# Patient Record
Sex: MALE | Race: WHITE | NOT HISPANIC OR LATINO | Employment: UNEMPLOYED | URBAN - METROPOLITAN AREA
[De-identification: names, ages, dates, MRNs, and addresses within clinical notes are randomized per-mention and may not be internally consistent; named-entity substitution may affect disease eponyms.]

---

## 2023-06-15 ENCOUNTER — TELEPHONE (OUTPATIENT)
Dept: TRANSPLANT | Facility: CLINIC | Age: 23
End: 2023-06-15

## 2023-06-15 DIAGNOSIS — E71.529 ALD (ADRENOLEUKODYSTROPHY) (H): Primary | ICD-10-CM

## 2023-09-01 ENCOUNTER — CARE COORDINATION (OUTPATIENT)
Dept: TRANSPLANT | Facility: CLINIC | Age: 23
End: 2023-09-01
Payer: COMMERCIAL

## 2023-09-06 PROCEDURE — 84439 ASSAY OF FREE THYROXINE: CPT | Performed by: PEDIATRICS

## 2023-09-06 PROCEDURE — 84443 ASSAY THYROID STIM HORMONE: CPT | Performed by: PEDIATRICS

## 2023-09-06 PROCEDURE — 80053 COMPREHEN METABOLIC PANEL: CPT | Performed by: PEDIATRICS

## 2023-09-06 PROCEDURE — 85014 HEMATOCRIT: CPT | Performed by: PEDIATRICS

## 2023-09-11 NOTE — CONFIDENTIAL NOTE
RECORDS RECEIVED FROM: internal /ce   DATE RECEIVED: 10.12.23    NOTES (FOR ALL VISITS) STATUS DETAILS   OFFICE NOTES from referring provider internal  Willy Quintanilla MD     MEDICATION LIST internal     IMAGING        MRI (BRAIN) ce  [place]- 8/18/22     LABS     DIABETES: HBGA1C, CREATININE, FASTING LIPIDS, MICROALBUMIN URINE, POTASSIUM, TSH, T4    THYROID: TSH, T4, CBC, THYRODLONULIN, TOTAL T3, FREE T4, CALCITONIN, CEA internal /ce TSH- 9.6.23  T4- 9.6.23  CMP- 9.6.23  Cbc- 9.6.23  Lipid- 8/18/22  Cortisol- 8/18/22  Vitamin D- 8/18/22

## 2023-09-16 ENCOUNTER — HEALTH MAINTENANCE LETTER (OUTPATIENT)
Age: 23
End: 2023-09-16

## 2023-09-28 DIAGNOSIS — E71.529 ALD (ADRENOLEUKODYSTROPHY) (H): Primary | ICD-10-CM

## 2023-09-28 RX ORDER — MINOXIDIL 2.5 MG/1
TABLET ORAL
Qty: 30 TABLET | Refills: 1 | Status: SHIPPED | OUTPATIENT
Start: 2023-09-28 | End: 2024-02-01

## 2023-10-04 DIAGNOSIS — E71.529 ALD (ADRENOLEUKODYSTROPHY) (H): Primary | ICD-10-CM

## 2023-10-04 RX ORDER — FLUDROCORTISONE ACETATE 0.1 MG/1
0.1 TABLET ORAL DAILY
Qty: 60 TABLET | Refills: 3 | Status: SHIPPED | OUTPATIENT
Start: 2023-10-04

## 2023-10-05 ASSESSMENT — ENCOUNTER SYMPTOMS
NERVOUS/ANXIOUS: 1
INSOMNIA: 0
DECREASED CONCENTRATION: 1
PANIC: 0
DEPRESSION: 0

## 2023-10-11 NOTE — PROGRESS NOTES
Appointment reminder phone call made to patient. No answer. LVM advising pt to arrive 15 mins early.  Shanna Perez, CANDY

## 2023-10-12 ENCOUNTER — PRE VISIT (OUTPATIENT)
Dept: ENDOCRINOLOGY | Facility: CLINIC | Age: 23
End: 2023-10-12

## 2023-10-12 ENCOUNTER — OFFICE VISIT (OUTPATIENT)
Dept: ENDOCRINOLOGY | Facility: CLINIC | Age: 23
End: 2023-10-12
Attending: PEDIATRICS
Payer: COMMERCIAL

## 2023-10-12 VITALS
SYSTOLIC BLOOD PRESSURE: 123 MMHG | HEART RATE: 52 BPM | BODY MASS INDEX: 23.4 KG/M2 | WEIGHT: 158 LBS | HEIGHT: 69 IN | OXYGEN SATURATION: 98 % | DIASTOLIC BLOOD PRESSURE: 70 MMHG

## 2023-10-12 DIAGNOSIS — E27.40 ADRENAL INSUFFICIENCY (H): Primary | ICD-10-CM

## 2023-10-12 DIAGNOSIS — E71.529 ADRENOLEUKODYSTROPHY (H): ICD-10-CM

## 2023-10-12 PROCEDURE — 99204 OFFICE O/P NEW MOD 45 MIN: CPT | Mod: GC | Performed by: INTERNAL MEDICINE

## 2023-10-12 RX ORDER — CYPROHEPTADINE HYDROCHLORIDE 4 MG/1
TABLET ORAL
COMMUNITY
Start: 2022-08-30

## 2023-10-12 RX ORDER — LURASIDONE HYDROCHLORIDE 20 MG/1
TABLET, FILM COATED ORAL
COMMUNITY
Start: 2023-08-28 | End: 2023-11-07

## 2023-10-12 RX ORDER — UBROGEPANT 100 MG/1
TABLET ORAL
COMMUNITY
Start: 2021-12-29 | End: 2024-08-17

## 2023-10-12 RX ORDER — HYDROCORTISONE SODIUM SUCCINATE 100 MG/2ML
100 INJECTION, POWDER, FOR SOLUTION INTRAMUSCULAR; INTRAVENOUS ONCE
COMMUNITY
Start: 2023-08-16

## 2023-10-12 RX ORDER — HYDROCORTISONE 5 MG/1
TABLET ORAL
COMMUNITY
Start: 2023-08-10

## 2023-10-12 RX ORDER — LANOLIN ALCOHOL/MO/W.PET/CERES
6 CREAM (GRAM) TOPICAL DAILY
COMMUNITY

## 2023-10-12 ASSESSMENT — PAIN SCALES - GENERAL: PAINLEVEL: NO PAIN (0)

## 2023-10-12 ASSESSMENT — ENCOUNTER SYMPTOMS
NERVOUS/ANXIOUS: 1
DECREASED CONCENTRATION: 1

## 2023-10-12 NOTE — NURSING NOTE
"Chief Complaint   Patient presents with    Endocrine Problem     Vital signs:      BP: 123/70 Pulse: 52     SpO2: 98 %     Height: 175.3 cm (5' 9\") Weight: 71.7 kg (158 lb)  Estimated body mass index is 23.33 kg/m  as calculated from the following:    Height as of this encounter: 1.753 m (5' 9\").    Weight as of this encounter: 71.7 kg (158 lb).        "

## 2023-10-12 NOTE — PROGRESS NOTES
"Endocrinology and Diabetes      Brett Negro is a 22 year old yo male who is being referred for: Adrenal Insufficiency    Medical History Pertinent to this consultation includes: ALD s/p Allo SCT, Adrenal Insufficiency    He has been living in [place] but is going to be moving to [place] to work as as a  starting this winter.     He has history of ALD diagnosed at age 1 after a maternal cousin was diagnosed with ALD.     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      He believes he was diagnosed with adrenal insufficiency shortly after ALD diagnosis.     He has been taking extended release hydrocortisone, Plenadren, for 3-4 years which he sources from [place]. He takes 20 mg daily. He feels generally well with that once a day dosing, better to when he was taking hydrocortisone in 3 separate doses. Previously. he was taking Hydrocortisone 10 mg AM / 5 mg PM / 7.5 mg     He reports he rarely misses any doses of hydrocortisone.    Medical history is significant for history of bipolar disorder which she tells me diagnosis has been changed to PTSD.    While he rarely has needed to take extra hydrocortisone for physical illnesses he has taken this after immunizations and particularly after emotional/psychological stress    He notes that when he has emotional stress, he will take 5 mg and this makes him feel better. He does this maybe 2-3 times per week.  When he has emotional stress he describes feeling weak, feels \"depleted\", lethargic, emotionally sensitive, achy. These symptoms improve with an additional dose of hydrocortisone    He has 10 mg hydrocortisone, has gotten instructions on taking 30 mg TID in the case of acute illness.     He has IM Hydrocortisone injection kit.     He has hypertension, currently taking amlodipine 2.5 mg for hypertension and minoxidil 2.5 mg for hair loss.     Prior laboratory studies  8/10/2022 7 AM  ACTH, 266 " "picograms per mL, 15-66  Cortisol, 9.4 micrograms per deciliter, 5-25  Testosterone, 635 ng/dL, 175-781    Recent Labs   Lab Test 09/06/23  1105      POTASSIUM 3.9   CHLORIDE 102   CO2 30*   ANIONGAP 9   GLC 76   BUN 17.5   CR 0.96   KRYS 9.9          Prior imaging studies         Allergies   Allergen Reactions     Cherry Other (See Comments)     Other reaction(s): Edema-Airway     Daucus Carota Anaphylaxis     Raw carrots    Other reaction(s): Edema-Airway   Raw carrots     [place] Pulp Other (See Comments), GI Disturbance and Shortness Of Breath     Throat itching, trouble breathing    Other reaction(s): Resp Distress   Throat itching, trouble breathing   Throat itching, trouble breathing   Throat itching, trouble breathing     [place] Oil Itching     Diethyl Toluamide (Deet) Hives     Nuts Itching     Prunus Persica Itching     Penicillins Dizziness, Hives, Itching and Rash     Other reaction(s): Dizziness       Past Medical History:   Diagnosis Date     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       Past Surgical History:   Procedure Laterality Date     BRAIN TUMOR EXCISION      Age 1 1/2       Social History     Tobacco Use     Smoking status: Former     Types: Cigarettes     Smokeless tobacco: Never   Substance Use Topics     Alcohol use: Yes     Comment: Occassionally     Drug use: Yes     Types: Marijuana         Review of Systems   Psychiatric/Behavioral:  Positive for decreased concentration. The patient is nervous/anxious.    All other systems reviewed and are negative.       Objective    /70 (BP Location: Right arm, Patient Position: Sitting, Cuff Size: Adult Regular)   Pulse 52   Ht 1.753 m (5' 9\")   Wt 71.7 kg (158 lb)   SpO2 98%   BMI 23.33 kg/m       Wt Readings from Last 3 Encounters:   10/12/23 71.7 kg (158 lb)   09/06/23 73 kg (160 lb 15 oz)        Body mass index is 23.33 kg/m .    BSA 1.87    Physical Exam  Constitutional:       General: He is not in acute distress.     " Appearance: He is not ill-appearing.   Neck:      Comments: No thyromegaly  Cardiovascular:      Rate and Rhythm: Normal rate.      Heart sounds: Normal heart sounds. No murmur heard.  Pulmonary:      Effort: Pulmonary effort is normal.      Breath sounds: Normal breath sounds. No wheezing or rales.   Abdominal:      Palpations: Abdomen is soft.      Tenderness: There is no abdominal tenderness.   Musculoskeletal:      Right lower leg: No edema.      Left lower leg: No edema.   Skin:     Comments: He has some scars in posterior neck, anterior right sided chest with some degree of hyperpigmentation   Neurological:      Mental Status: He is alert.           Assessment.       ICD-10-CM    1. Adrenal insufficiency (H24)  E27.40 Adult Endocrinology  Referral      2. Adrenoleukodystrophy (H24)  E71.529 Adult Endocrinology  Referral           Plan    Adrenal insufficiency (H24)  Overall he reports feeling generally well on current dose of hydrocortisone.  He currently takes long-acting hydrocortisone (Plenadren) 20 mg in the morning with very consistency as his glucocorticoid replacement in addition to fludrocortisone 0.1 mg for mineralocorticoid replacement. Discussed that there is no role of an ACTH target for glucocorticoid replacement.     If he were to stay here for follow-up we would have recommended monitoring of electrolytes and renin to see if he can be on a lesser amount of fludrocortisone replacement.    Reviewed on the importance on taking the least amount of glucocorticoid replacement where he feels okay.  We discussed on trying to minimize the use of hydrocortisone for purelypsychological stress but if we were to do so he could try taking a 2.5 mg dose instead of 5 mg. He is agreeable on trying this.      For sick day dosing reviewed on taking hydrocortisone 20 mg every 8 hours but not the extended release formulation that he currently takes for regular replacement if he were acutely ill  with fevers for example.    He knows to inject IM hydrocortisone and seek care in the ER if he were to have an illness with nausea and vomiting which would prevent him from taking oral hydrocortisone.  He has a current Solu-Cortef kit at home.    Also stressed the importance of having a medical alert luci or similar             Tito Green MD  Endocrinology Fellow

## 2023-10-12 NOTE — LETTER
10/12/2023       RE: Brett Negro  [address]  [place] [address]     Dear Colleague,    Thank you for referring your patient, Brett Negro, to the [facility] [place] at [facility]. Please see a copy of my visit note below.    Appointment reminder phone call made to patient. No answer. LVM advising pt to arrive 15 mins early.  Shanna Perez, [facility]    Endocrinology and Diabetes      Brett Negro is a 22 year old yo male who is being referred for: Adrenal Insufficiency    Medical History Pertinent to this consultation includes: ALD s/p Allo SCT, Adrenal Insufficiency    He has been living in [place] but is going to be moving to [place] to work as as a  starting this winter.     He has history of ALD diagnosed at age 1 after a maternal cousin was diagnosed with ALD.     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      He believes he was diagnosed with adrenal insufficiency shortly after ALD diagnosis.     He has been taking extended release hydrocortisone, Plenadren, for 3-4 years which he sources from [place]. He takes 20 mg daily. He feels generally well with that once a day dosing, better to when he was taking hydrocortisone in 3 separate doses. Previously. he was taking Hydrocortisone 10 mg AM / 5 mg PM / 7.5 mg     He reports he rarely misses any doses of hydrocortisone.    Medical history is significant for history of bipolar disorder which she tells me diagnosis has been changed to PTSD.    While he rarely has needed to take extra hydrocortisone for physical illnesses he has taken this after immunizations and particularly after emotional/psychological stress    He notes that when he has emotional stress, he will take 5 mg and this makes him feel better. He does this maybe 2-3 times per week.  When he has emotional stress he describes  "feeling weak, feels \"depleted\", lethargic, emotionally sensitive, achy. These symptoms improve with an additional dose of hydrocortisone    He has 10 mg hydrocortisone, has gotten instructions on taking 30 mg TID in the case of acute illness.     He has IM Hydrocortisone injection kit.     He has hypertension, currently taking amlodipine 2.5 mg for hypertension and minoxidil 2.5 mg for hair loss.     Prior laboratory studies  8/10/2022 7 AM  ACTH, 266 picograms per mL, 15-66  Cortisol, 9.4 micrograms per deciliter, 5-25  Testosterone, 635 ng/dL, 175-781    Recent Labs   Lab Test 09/06/23  1105      POTASSIUM 3.9   CHLORIDE 102   CO2 30*   ANIONGAP 9   GLC 76   BUN 17.5   CR 0.96   KRYS 9.9          Prior imaging studies         Allergies   Allergen Reactions    Cherry Other (See Comments)     Other reaction(s): Edema-Airway    Daucus Carota Anaphylaxis     Raw carrots    Other reaction(s): Edema-Airway   Raw carrots    [place] Pulp Other (See Comments), GI Disturbance and Shortness Of Breath     Throat itching, trouble breathing    Other reaction(s): Resp Distress   Throat itching, trouble breathing   Throat itching, trouble breathing   Throat itching, trouble breathing    [place] Oil Itching    Diethyl Toluamide (Deet) Hives    Nuts Itching    Prunus Persica Itching    Penicillins Dizziness, Hives, Itching and Rash     Other reaction(s): Dizziness       Past Medical History:   Diagnosis Date    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       Past Surgical History:   Procedure Laterality Date    BRAIN TUMOR EXCISION      Age 1 1/2       Social History     Tobacco Use    Smoking status: Former     Types: Cigarettes    Smokeless tobacco: Never   Substance Use Topics    Alcohol use: Yes     Comment: Occassionally    Drug use: Yes     Types: Marijuana         Review of Systems   Psychiatric/Behavioral:  Positive for decreased concentration. The patient is nervous/anxious.    All other systems reviewed and " "are negative.       Objective    /70 (BP Location: Right arm, Patient Position: Sitting, Cuff Size: Adult Regular)   Pulse 52   Ht 1.753 m (5' 9\")   Wt 71.7 kg (158 lb)   SpO2 98%   BMI 23.33 kg/m       Wt Readings from Last 3 Encounters:   10/12/23 71.7 kg (158 lb)   09/06/23 73 kg (160 lb 15 oz)        Body mass index is 23.33 kg/m .    BSA 1.87    Physical Exam  Constitutional:       General: He is not in acute distress.     Appearance: He is not ill-appearing.   Neck:      Comments: No thyromegaly  Cardiovascular:      Rate and Rhythm: Normal rate.      Heart sounds: Normal heart sounds. No murmur heard.  Pulmonary:      Effort: Pulmonary effort is normal.      Breath sounds: Normal breath sounds. No wheezing or rales.   Abdominal:      Palpations: Abdomen is soft.      Tenderness: There is no abdominal tenderness.   Musculoskeletal:      Right lower leg: No edema.      Left lower leg: No edema.   Skin:     Comments: He has some scars in posterior neck, anterior right sided chest with some degree of hyperpigmentation   Neurological:      Mental Status: He is alert.           Assessment.       ICD-10-CM    1. Adrenal insufficiency (H24)  E27.40 Adult Endocrinology  Referral      2. Adrenoleukodystrophy (H24)  E71.529 Adult Endocrinology  Referral           Plan    Adrenal insufficiency (H24)  Overall he reports feeling generally well on current dose of hydrocortisone.  He currently takes long-acting hydrocortisone (Plenadren) 20 mg in the morning with very consistency as his glucocorticoid replacement in addition to fludrocortisone 0.1 mg for mineralocorticoid replacement. Discussed that there is no role of an ACTH target for glucocorticoid replacement.     If he were to stay here for follow-up we would have recommended monitoring of electrolytes and renin to see if he can be on a lesser amount of fludrocortisone replacement.    Reviewed on the importance on taking the least amount of " glucocorticoid replacement where he feels okay.  We discussed on trying to minimize the use of hydrocortisone for purelypsychological stress but if we were to do so he could try taking a 2.5 mg dose instead of 5 mg. He is agreeable on trying this.      For sick day dosing reviewed on taking hydrocortisone 20 mg every 8 hours but not the extended release formulation that he currently takes for regular replacement if he were acutely ill with fevers for example.    He knows to inject IM hydrocortisone and seek care in the ER if he were to have an illness with nausea and vomiting which would prevent him from taking oral hydrocortisone.  He has a current Solu-Cortef kit at home.    Also stressed the importance of having a medical alert bracelet or similar             Tito Green MD  Endocrinology Fellow    Attestation signed by Lane Chavez MD at 10/14/2023  4:29 PM:  Attestation    I have seen patient and discussed management plan with the patient on October 12, 2023.   I have discussed management plan with Endocrinology Fellow and agree with the assessment and plan of care as documented below.        Adrenoleukodystrophy status post allo SCT and diagnosed with adrenal insufficiency shortly after adrenoleukodystrophy diagnosis.  Currently, he is on hydrocortisone extended release and he has been on this dose for 3-4 years.  AI symptoms well controlled on his current dose of hydrocortisone.  We had a long discussion regarding sick day rules particularly surrounding mental health illness and steroid management.  He has been administering short acting hydrocortisone 5 mg for panic attacks or anxiety symptoms based on severity and its been helping.  Per literature review there are case reports of steroid adjustment for mental health distress.  In this case, it has helped therefore we discussed that when it is absolutely necessary to take hydrocortisone 2.5 mg for mental health exacerbations like panic attacks or  severe anxiety symptoms.  No signs and symptoms of Cushing's syndrome on the current dose of hydrocortisone.  He also takes fludrocortisone 0.1 mg daily and his blood pressure is within the range and his most recent potassium is a stable.  With his routine future labs renin activity can be checked.  Other hormones including testosterone reviewed which were normal previously.  If he remains in [place] would like to see him twice a year but per report he is moving to [place] and we encouraged to establish care locally.    55 minutes spent by me on the date of the encounter doing chart review, history and exam, documentation and further activities per the note.    Lane Chavez MD  Staff Endocrinologist

## 2023-10-13 NOTE — ASSESSMENT & PLAN NOTE
Overall he reports feeling generally well on current dose of hydrocortisone (10.69 mg / m2).  He currently takes long-acting hydrocortisone (Plenadren) 20 mg in the morning with very consistency as his glucocorticoid replacement in addition to fludrocortisone 0.1 mg for mineralocorticoid replacement. Discussed that there is no role of an ACTH target for glucocorticoid replacement.     If he were to stay here for follow-up we would have recommended monitoring of electrolytes and renin to see if he can be on a lesser amount of fludrocortisone replacement.    Reviewed on the importance on taking the least amount of glucocorticoid replacement where he feels okay.  We discussed on trying to minimize the use of hydrocortisone for purelypsychological stress but if we were to do so he could try taking a 2.5 mg dose instead of 5 mg. He is agreeable on trying this.      For sick day dosing reviewed on taking hydrocortisone 20 mg every 8 hours but not the extended release formulation that he currently takes for regular replacement if he were acutely ill with fevers for example.    He knows to inject IM hydrocortisone and seek care in the ER if he were to have an illness with nausea and vomiting which would prevent him from taking oral hydrocortisone.  He has a current Solu-Cortef kit at home.    Also stressed the importance of having a medical alert bracelet or similar

## 2023-10-31 ENCOUNTER — HOSPITAL ENCOUNTER (EMERGENCY)
Facility: CLINIC | Age: 23
Discharge: HOME | End: 2023-10-31
Attending: EMERGENCY MEDICINE | Admitting: EMERGENCY MEDICINE
Payer: COMMERCIAL

## 2023-10-31 VITALS
HEIGHT: 69 IN | WEIGHT: 160 LBS | SYSTOLIC BLOOD PRESSURE: 112 MMHG | RESPIRATION RATE: 16 BRPM | TEMPERATURE: 98.5 F | OXYGEN SATURATION: 98 % | BODY MASS INDEX: 23.7 KG/M2 | HEART RATE: 48 BPM | DIASTOLIC BLOOD PRESSURE: 71 MMHG

## 2023-10-31 DIAGNOSIS — G43.819 OTHER MIGRAINE WITHOUT STATUS MIGRAINOSUS, INTRACTABLE: Primary | ICD-10-CM

## 2023-10-31 PROCEDURE — 96361 HYDRATE IV INFUSION ADD-ON: CPT | Performed by: EMERGENCY MEDICINE

## 2023-10-31 PROCEDURE — 99285 EMERGENCY DEPT VISIT HI MDM: CPT | Performed by: EMERGENCY MEDICINE

## 2023-10-31 PROCEDURE — 258N000003 HC RX IP 258 OP 636: Performed by: EMERGENCY MEDICINE

## 2023-10-31 PROCEDURE — 250N000011 HC RX IP 250 OP 636: Mod: JZ | Performed by: EMERGENCY MEDICINE

## 2023-10-31 PROCEDURE — 96375 TX/PRO/DX INJ NEW DRUG ADDON: CPT | Performed by: EMERGENCY MEDICINE

## 2023-10-31 PROCEDURE — 99284 EMERGENCY DEPT VISIT MOD MDM: CPT | Mod: 25 | Performed by: EMERGENCY MEDICINE

## 2023-10-31 PROCEDURE — 96374 THER/PROPH/DIAG INJ IV PUSH: CPT | Performed by: EMERGENCY MEDICINE

## 2023-10-31 RX ORDER — METOCLOPRAMIDE HYDROCHLORIDE 5 MG/ML
5 INJECTION INTRAMUSCULAR; INTRAVENOUS ONCE
Status: COMPLETED | OUTPATIENT
Start: 2023-10-31 | End: 2023-10-31

## 2023-10-31 RX ORDER — DIPHENHYDRAMINE HYDROCHLORIDE 50 MG/ML
25 INJECTION INTRAMUSCULAR; INTRAVENOUS ONCE
Status: COMPLETED | OUTPATIENT
Start: 2023-10-31 | End: 2023-10-31

## 2023-10-31 RX ORDER — KETOROLAC TROMETHAMINE 15 MG/ML
15 INJECTION, SOLUTION INTRAMUSCULAR; INTRAVENOUS ONCE
Status: COMPLETED | OUTPATIENT
Start: 2023-10-31 | End: 2023-10-31

## 2023-10-31 RX ADMIN — SODIUM CHLORIDE 1000 ML: 9 INJECTION, SOLUTION INTRAVENOUS at 20:26

## 2023-10-31 RX ADMIN — KETOROLAC TROMETHAMINE 15 MG: 15 INJECTION, SOLUTION INTRAMUSCULAR; INTRAVENOUS at 20:27

## 2023-10-31 RX ADMIN — DIPHENHYDRAMINE HYDROCHLORIDE 25 MG: 50 INJECTION, SOLUTION INTRAMUSCULAR; INTRAVENOUS at 20:28

## 2023-10-31 RX ADMIN — METOCLOPRAMIDE 5 MG: 5 INJECTION, SOLUTION INTRAMUSCULAR; INTRAVENOUS at 20:29

## 2023-10-31 ASSESSMENT — ACTIVITIES OF DAILY LIVING (ADL): ADLS_ACUITY_SCORE: 35

## 2023-11-01 NOTE — ED TRIAGE NOTES
Pt came in for headache started on 10/25. Currently 6/10 pain. Hx of chronic migraines. Pain was 10/10 this morning. Pt hoping to get migraine cocktail. Took home medications with little relief      Triage Assessment (Adult)       Row Name 10/31/23 [id number]          Triage Assessment    Airway WDL WDL        Respiratory WDL    Respiratory WDL WDL        Skin Circulation/Temperature WDL    Skin Circulation/Temperature WDL WDL        Cardiac WDL    Cardiac WDL X;rhythm     Pulse Rate & Regularity bradycardic        Peripheral/Neurovascular WDL    Peripheral Neurovascular WDL WDL        Cognitive/Neuro/Behavioral WDL    Cognitive/Neuro/Behavioral WDL WDL

## 2023-11-01 NOTE — ED PROVIDER NOTES
ED Provider Note  [facility]      History     Chief Complaint   Patient presents with    Headache     HPI  Brett Negro is a 22 year old male with a past medical history of autism, anxiety, depression, and adrenoleukodystrophy who presents to the emergency department seeking evaluation of a migraine. He states that this has been ongoing for the past 4-5 days. He has tried his usual medications and neuromodulation devices to no avail. He states that it has improved slightly since this morning, but that it is still quite bad. In the past, he has experienced episodes on ongoing intractable migraines, and has been helped by Erenumab-aooe. He states that tryptans do not work. He states that he has not has trouble with his blood pressures as of late. In regards to his migraine manifestations, he states that his entire head is experiencing pain and pressure. With this, he has had difficulty focusing, sensitivity to light and sound, and irritability. He denies any visual changes beyond photophobia. He states that he generally feels weak and his neck feels tight. In regards to possible causes of the migraine, he states that he has been under a good deal of stress for a large paper that was due as well as the changing of the seasons.         Past Medical History  Past Medical History:   Diagnosis Date    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     Past Surgical History:   Procedure Laterality Date    BRAIN TUMOR EXCISION      Age 1 1/2     amLODIPine (NORVASC) 2.5 MG tablet  Calcium Carb-Cholecalciferol 600-10 MG-MCG CAPS  cyproheptadine (PERIACTIN) 4 MG tablet  erenumab-aooe (AIMOVIG) 140 MG/ML injection  fludrocortisone (FLORINEF) 0.1 MG tablet  hydrocortisone (CORTEF) 5 MG tablet  lurasidone (LATUDA) 20 MG TABS tablet  melatonin 3 MG tablet  minoxidil (LONITEN) 2.5 MG tablet  SOLU-CORTEF 100 MG injection  UBRELVY 100 MG tablet      Allergies   Allergen Reactions    Cherry Other  "(See Comments)     Other reaction(s): Edema-Airway    Daucus Carota Anaphylaxis     Raw carrots    Other reaction(s): Edema-Airway   Raw carrots    [place] Pulp Other (See Comments), GI Disturbance and Shortness Of Breath     Throat itching, trouble breathing    Other reaction(s): Resp Distress   Throat itching, trouble breathing   Throat itching, trouble breathing   Throat itching, trouble breathing    [place] Oil Itching    Diethyl Toluamide (Deet) Hives    Nuts Itching    Prunus Persica Itching    Penicillins Dizziness, Hives, Itching and Rash     Other reaction(s): Dizziness     Family History  Family History   Problem Relation Age of Onset    Other - See Comments Mother         ALD carrier    Anxiety Disorder Sister     Mental Illness Paternal Grandmother     Hypertension Paternal Grandfather      Social History   Social History     Tobacco Use    Smoking status: Former     Types: Cigarettes    Smokeless tobacco: Never   Substance Use Topics    Alcohol use: Yes     Comment: Occassionally    Drug use: Yes     Types: Marijuana      Past medical history, past surgical history, medications, allergies, family history, and social history were reviewed with the patient. No additional pertinent items.      A medically appropriate review of systems was performed with pertinent positives and negatives noted in the HPI, and all other systems negative.    Physical Exam   BP: 127/72  Pulse: (!) 48  Temp: 97.9  F (36.6  C)  Resp: 18  Height: 175.3 cm (5' 9\")  Weight: 72.6 kg (160 lb)  SpO2: 96 %  Physical Exam  Vitals and nursing note reviewed.   Constitutional:       General: He is in acute distress.      Appearance: Normal appearance. He is not ill-appearing or diaphoretic.   HENT:      Head: Normocephalic.      Nose: Nose normal.   Eyes:      Pupils: Pupils are equal, round, and reactive to light.   Neck:      Comments: Surgical scars noted  Cardiovascular:      Rate and Rhythm: Normal rate and regular rhythm.   Pulmonary:    "   Effort: Pulmonary effort is normal.   Abdominal:      General: There is no distension.   Musculoskeletal:         General: No deformity. Normal range of motion.      Cervical back: Normal range of motion and neck supple. No rigidity or tenderness.   Skin:     General: Skin is warm.   Neurological:      General: No focal deficit present.      Mental Status: He is alert and oriented to person, place, and time.      Cranial Nerves: No cranial nerve deficit.      Sensory: No sensory deficit.      Motor: No weakness.      Coordination: Coordination normal.      Gait: Gait normal.   Psychiatric:         Mood and Affect: Mood normal.           ED Course, Procedures, & Data     ED Course as of 10/31/23 2215   Tue Oct 31, 2023   2016 Patient with history of chronic migraines, presents the ED with headache for the past 3 to 4 days, unrelieved with home medications.  On arrival, has normal vital signs aside from sinus bradycardia.  Nonfocal neuro exam.   2016 No acute onset, maximal intensity at onset, or thunderclap quality, unlikely subarachnoid hemorrhage.  Nonfocal neuro exam, unlikely ischemic CVA.  No ill appearance or new to rigidity, less likely meningitis/encephalitis.   2016 Likely diagnosis acute on chronic migraine.  We will give migraine cocktail of diphenhydramine, ketorolac, metoclopramide, and 1 L of IV normal saline and reassess.   2017 BP: 127/72   2017 Temp: 97.9  F (36.6  C)   2017 Pulse(!): 48   2017 Resp: 18   2017 SpO2: 96 %   2156 On reassessment, patient's symptoms are significantly improved.  Repeat exam is reassuring.  Patient feels comfortable home.  Plan of care discussed with patient and his father who is at bedside.  Plan to call neurology and follow-up in outpatient setting.  Educated reasons to return to the ED.     No results found for any visits on 10/31/23.  Medications   sodium chloride 0.9% BOLUS 1,000 mL (0 mLs Intravenous Stopped 10/31/23 2125)   ketorolac (TORADOL) injection 15 mg (15  mg Intravenous $Given 10/31/23 2027)   metoclopramide (REGLAN) injection 5 mg (5 mg Intravenous $Given 10/31/23 2029)   diphenhydrAMINE (BENADRYL) injection 25 mg (25 mg Intravenous $Given 10/31/23 2028)     Labs Ordered and Resulted from Time of ED Arrival to Time of ED Departure - No data to display  No orders to display          Critical care was not performed.     Medical Decision Making  The patient's presentation was of high complexity (a chronic illness severe exacerbation, progression, or side effect of treatment).    The patient's evaluation involved:  review of 3+ test result(s) ordered prior to this encounter (reviwed MRI from 2022, 2021, and 2020)    The patient's management necessitated moderate risk (prescription drug management including medications given in the ED).    Assessment & Plan    Acute on chronic migraine.  Sx improved significantly with migraine cocktail.  Plan for discharge with neurology follow-up.  We will continue usual medications.  Educated reasons to return to the ED.    I have reviewed the nursing notes. I have reviewed the findings, diagnosis, plan and need for follow up with the patient.    Discharge Medication List as of 10/31/2023  9:59 PM          Final diagnoses:   Other migraine without status migrainosus, intractable   I, Noe Novoa, am serving as a trained medical scribe to document services personally performed by Ernesto Vaz DO, based on the provider's statements to me.     IErnesto DO, was physically present and have reviewed and verified the accuracy of this note documented by Noe Novoa.      Ernesto Vaz DO  [facility] EMERGENCY DEPARTMENT  10/31/2023     Ernesto Vaz DO  10/31/23 0832

## 2023-11-01 NOTE — DISCHARGE INSTRUCTIONS
Be sure to follow-up with your neurologist.  Take all your usual medications as prescribed.  Return to the ED with any new or worsening symptoms.

## 2023-11-06 ENCOUNTER — TELEPHONE (OUTPATIENT)
Dept: NEUROLOGY | Facility: CLINIC | Age: 23
End: 2023-11-06
Payer: COMMERCIAL

## 2023-11-06 ASSESSMENT — HEADACHE IMPACT TEST (HIT 6)
HOW OFTEN HAVE YOU FELT FED UP OR IRRITATED BECAUSE OF YOUR HEADACHES: ALWAYS
HOW OFTEN DID HEADACHS LIMIT CONCENTRATION ON WORK OR DAILY ACTIVITY: VERY OFTEN
HIT6 TOTAL SCORE: 68
WHEN YOU HAVE A HEADACHE HOW OFTEN DO YOU WISH YOU COULD LIE DOWN: VERY OFTEN
HOW OFTEN DO HEADACHES LIMIT YOUR DAILY ACTIVITIES: VERY OFTEN
WHEN YOU HAVE HEADACHES HOW OFTEN IS THE PAIN SEVERE: VERY OFTEN
HOW OFTEN HAVE YOU FELT TOO TIRED TO WORK BECAUSE OF YOUR HEADACHES: VERY OFTEN

## 2023-11-06 NOTE — TELEPHONE ENCOUNTER
Call was placed to patient regarding referral for Neurology. Message was left for patient to call and schedule with first available provider within the headache team.

## 2023-11-07 ENCOUNTER — OFFICE VISIT (OUTPATIENT)
Dept: NEUROLOGY | Facility: CLINIC | Age: 23
End: 2023-11-07
Attending: EMERGENCY MEDICINE
Payer: COMMERCIAL

## 2023-11-07 ENCOUNTER — TELEPHONE (OUTPATIENT)
Dept: NEUROLOGY | Facility: CLINIC | Age: 23
End: 2023-11-07

## 2023-11-07 VITALS
HEIGHT: 69 IN | HEART RATE: 54 BPM | WEIGHT: 160 LBS | SYSTOLIC BLOOD PRESSURE: 133 MMHG | BODY MASS INDEX: 23.7 KG/M2 | DIASTOLIC BLOOD PRESSURE: 83 MMHG | OXYGEN SATURATION: 98 %

## 2023-11-07 DIAGNOSIS — G43.709 CHRONIC MIGRAINE WITHOUT AURA WITHOUT STATUS MIGRAINOSUS, NOT INTRACTABLE: Primary | ICD-10-CM

## 2023-11-07 PROCEDURE — 99204 OFFICE O/P NEW MOD 45 MIN: CPT

## 2023-11-07 RX ORDER — ONDANSETRON 4 MG/1
4 TABLET, FILM COATED ORAL EVERY 8 HOURS PRN
Qty: 20 TABLET | Refills: 3 | Status: SHIPPED | OUTPATIENT
Start: 2023-11-07

## 2023-11-07 ASSESSMENT — PAIN SCALES - GENERAL: PAINLEVEL: MILD PAIN (2)

## 2023-11-07 NOTE — TELEPHONE ENCOUNTER
Patient's father Selvin requests a call back regarding status of Botox pre-authorization. Please call Selvin at [phone]. Thank you

## 2023-11-07 NOTE — LETTER
11/7/2023         RE: Brett Negro  [address]  [place] [address]        Dear Colleague,    Thank you for referring your patient, Brett Negro, to the [facility]. Please see a copy of my visit note below.    [facility]   Headache Neurology Consult    November 7, 2023     Brett Negro MRN# [id number]   YOB: 2000 Age: 22 year old     Referring provider: Ernesto Vaz          Assessment and Recommendations:     Brett Negro is a 22 year old male who presents for further evaluation of headache.    His headache presentation meets criteria for chronic migraine without aura. His headache history is complicated by history of adrenoleukodystrophy, adrenal insufficiency.     His neurologic examination is overall intact today.  He gets routine MRI brains to monitor his adrenoleukodystrophy. Reviewed his last MRI from August 2022 which is overall reassuring. I did not recommend further evaluation for secondary cause of headache today.     We discussed the following treatment strategy:  - For acute treatment of mild headache, he may use naproxen as needed, not to exceed 14 days per month to avoid medication overuse.   - For acute treatment of moderate to severe headache, he may use Ubrelvy 100 mg taken at onset of headache with a repeat dose after 2 hours if needed.   - He may use Cefaly and Nerivio devices for acute headache treatment.   - For nausea with headache, he may use ondansetron 4 mg tablet as needed.  Metoclopramide has also historically been helpful.   - Should he require anything for intractable headache rescue, he has previously responded well to a combination of ketorolac, metoclopramide, and diphenhydramine.    His current frequency and severity of headaches warrant prevention.  - I recommend he continue with Aimovig 140 mg subcutaneous injection every 30 days.  - I recommend a retrial of  botulinum toxin injections following a chronic migraine protocol.  Previously he was getting 155 units but has not received injections in over 7 months.  We will work to obtain prior authorization for this.    I will see him back in clinic in a few weeks for first round of injections.  We will plan for a routine follow-up in 6 to 9 months to monitor effectiveness.    Marzena Bhandari PA-C  Headache Neurology  [facility]            Chief Complaint:     Chief Complaint   Patient presents with     Headache     Other migraine without status migrainous            History is obtained from the patient and medical record.      Brett Negro is a 22 year old male who presents for further evaluation of headaches.     He reports a history of chronic migraines for the past 5 years or so.   Headache can be intense pressure at bilateral sides of the head. Headache can also be unilateral, though laterality can vary. He can also have generalized holocephalic headache pain. Headaches can be pounding or throbbing, and he can also occasionally have sharp or stabbing headaches.   He can have neck tension or jaw pain at times. Typical headaches are a 5-7/10 but they can reach an 11/10 when severe.   Typically headaches last several hours in duration. Rarely do they last several days in a row.    He has associated nausea without vomiting. He reports photophobia, phonophobia, some osmophobia. He will feel generally weak and fatigued.     He currently reports 30/30 headache days per month with 16-20/30 severe headache days per month. He notes headaches have been worse the past few months. Over the summer, he would have reported 8/30 headache days per month with 2-4/30 severe headache days per month.     He reports his vision can be more out of focus when he has a headache. He denies typical aura or other visual disturbances.   He denies focal paresthesias or weakness, unilateral autonomic features, positional  component, exertional component.     For acute treatment of headaches, he will use Cefaly and Nerivio. He will use Ubrelvy 100 mg and this is effective. Occasionally he will use naproxen or Excedrin. Acetaminophen is not very effective.    For headache prevention, he currently uses Aimovig 150 mg monthly. He previously was also doing Botox injections which were helpful, [place]     Last week was seen in the ER for migraine lasting 1+ week.  He thinks the combination of increased mental workload, dietary changes, weather changes may have provoked his recent bout of migraine.     He is trying to follow a keto diet.     He has a history of adrenal insufficiency.     He denies family history of headache.     Current headache treatments:  Acute therapies:  - Excedrin, naproxen  - Ubrelvy 100 mg - effective   - Cefaly  - Nerivio  - Cyproheptadine - uses for weather-related headaches, somewhat helpful, causes drowsiness    Preventative therapies:  - Aimovig 140 mg - helpful  - Amlodipine (hypertension)  - Magnesium  - Riboflavin     Supportive therapies:    Previous treatments tried:  Acute therapies:  - Sumatriptan - fatigue, rebound headache  - Eletriptan - fatigue  - Nurtec - helpful but likes Ubrelvy better    Preventative therapies:  - Venlafaxine - not effective   - Topiramate - not effective  - Depakote - cognitive slowing, worse depression  - Botox 155 units - last done about 7 months ago    Supportive therapies:            Past Medical History:     Past Medical History:   Diagnosis Date     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      Depression, anxiety, Bipolar 2 disorder, cPTSD - follows with therapist weekly     History of adrenoleukodystrophy, s/p stem cell transplant  Astrocytoma removed when he was a child          Past Surgical History:     Past Surgical History:   Procedure Laterality Date     BRAIN TUMOR EXCISION      Age 1 1/2             Social History:      Social History     Socioeconomic History     Marital status: Patient Declined     Spouse name: Not on file     Number of children: Not on file     Years of education: Not on file     Highest education level: Not on file   Occupational History     Occupation: Student,    Tobacco Use     Smoking status: Former     Types: Cigarettes     Smokeless tobacco: Never   Substance and Sexual Activity     Alcohol use: Yes     Comment: Occassionally     Drug use: Yes     Types: Marijuana     Sexual activity: Not on file   Other Topics Concern     Not on file   Social History Narrative     Not on file     Social Determinants of Health     Financial Resource Strain: Not on file   Food Insecurity: Not on file   Transportation Needs: Not on file   Physical Activity: Not on file   Stress: Not on file   Social Connections: Not on file   Interpersonal Safety: Not on file   Housing Stability: Not on file             Family History:     Family History   Problem Relation Age of Onset     Other - See Comments Mother         ALD carrier     Anxiety Disorder Sister      Mental Illness Paternal Grandmother      Hypertension Paternal Grandfather              Allergies:      Allergies   Allergen Reactions     Cherry Other (See Comments)     Other reaction(s): Edema-Airway     Daucus Carota Anaphylaxis     Raw carrots    Other reaction(s): Edema-Airway   Raw carrots     [place] Pulp Other (See Comments), GI Disturbance and Shortness Of Breath     Throat itching, trouble breathing    Other reaction(s): Resp Distress   Throat itching, trouble breathing   Throat itching, trouble breathing   Throat itching, trouble breathing     [place] Oil Itching     Diethyl Toluamide (Deet) Hives     Nuts Itching     Prunus Persica Itching     Penicillins Dizziness, Hives, Itching and Rash     Other reaction(s): Dizziness             Medications:     Current Outpatient Medications:      amLODIPine (NORVASC) 2.5 MG tablet, Take 2.5 mg by mouth, Disp:  ", Rfl:      Calcium Carb-Cholecalciferol 600-10 MG-MCG CAPS, Take by mouth., Disp: , Rfl:      cyproheptadine (PERIACTIN) 4 MG tablet, One tab or 1/2 a tab at bedtime nightly or as needed for weather related headaches, Disp: , Rfl:      erenumab-aooe (AIMOVIG) 140 MG/ML injection, Inject 140 mg Subcutaneous every 30 days, Disp: , Rfl:      fludrocortisone (FLORINEF) 0.1 MG tablet, Take 1 tablet (0.1 mg) by mouth daily, Disp: 60 tablet, Rfl: 3     hydrocortisone (CORTEF) 5 MG tablet, , Disp: , Rfl:      melatonin 3 MG tablet, Take 6 mg by mouth daily, Disp: , Rfl:      minoxidil (LONITEN) 2.5 MG tablet, Take one-half tablet per day to equal 1.25mg per day, Disp: 30 tablet, Rfl: 1     SOLU-CORTEF 100 MG injection, Inject 100 mg into the muscle once, Disp: , Rfl:      UBRELVY 100 MG tablet, take 1 tablet by mouth once daily if needed, Disp: , Rfl:      lurasidone (LATUDA) 20 MG TABS tablet, , Disp: , Rfl:           Physical Exam:   /83   Pulse 54   Ht 1.753 m (5' 9\")   Wt 72.6 kg (160 lb)   SpO2 98%   BMI 23.63 kg/m       General: In no acute distress.  Head: Normocephalic, atraumatic. No radiating pain with palpation over the supraorbital notches, occipital nerves. Temporal pulses intact.   Neck: Normal range of motion with lateral head movements and neck flexion.  Eyes: No conjunctival injection, no scleral icterus.     Neurologic Exam:  Mental Status Exam: Alert, awake and oriented to situation. No dysarthria. Speech of normal fluency.  Cranial Nerves: Fundoscopic exam with clear disc margins bilaterally. PERRLA, EOMs intact, no nystagmus, facial movements symmetric, facial sensation intact to light touch, hearing intact to conversation, trapezius and SCMs 5/5 bilaterally, tongue midline and fully mobile. No tongue atrophy or fasciculations.   Motor: Normal tone in all four extremities, no atrophy or fasciculations. 5/5 strength bilaterally in shoulder abduction, elbow flexion and extension, wrist flexion " and extension, hip flexion, knee flexion and extension, dorsiflexion and plantarflexion. No tremors or abnormal movements noted.  Sensory: Sensation intact to light touch on arms and legs bilaterally.   Coordination: Finger-nose-finger intact bilaterally. Rapidly alternating movements intact bilaterally in the upper extremities. Normal finger tapping bilaterally. Normal Romberg.  Reflexes: 2+ and symmetric in triceps, biceps, brachioradialis, patellar, and Achilles. Plantar reflexes are downgoing bilaterally.  Gait: Normal gait. Able to toe and heel walk. Normal tandem gait.            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     COMPARISON: MRI Brain 6/14/21     TECHNIQUE/PROTOCOL: Standard adult brain MRI protocol pre and post contrast   and multivoxel 2D MRI spectroscopy.     BRAIN FINDINGS:     Redemonstrated are postsurgical changes associated with suboccipital   craniotomy and resection of the prior cerebellar mass. Unchanged areas of   elevated T2/FLAIR signal abnormality adjacent to the posterior fossa   resection cavity. No abnormal enhancement. No change from the prior.     Other brain Parenchyma:  No hemorrhage, cerebral edema, acute cortical   infarction, or midline shift. Prominent supratentorial perivascular spaces   are again noted.   Ventricles and Sulci: Mildly prominent for age, unchanged.   Extra-Axial Spaces: No extra-axial fluid collection.   Basal Cisterns: Normal.   Intracranial Flow-Voids: Normal.     Paranasal Sinuses: Normal.   Mastoid Sinuses: Normal.   Orbits: Normal.   Cranium: Suboccipital craniotomy.       MR SPECTROSCOPY:   2D MR spectroscopy was performed to evaluate the areas of signal   abnormality adjacent to the posterior fossa  resection cavity in the medial   cerebellum on the left using echo times of 30 and 135 ms. This area   demonstrates decreased NABIL and elevated lactate peak consistent with   gliosis, and unchanged.     IMPRESSION:   Stable exam compared to 6/14/21. Post surgical change of the posterior   fossa related to prior mass resection. No evidence of disease progression.         Again, thank you for allowing me to participate in the care of your patient.        Sincerely,        BAKARI CARBALLO PA-C

## 2023-11-07 NOTE — PROGRESS NOTES
[facility]   Headache Neurology Consult    November 7, 2023     Brett Negro MRN# [id number]   YOB: 2000 Age: 22 year old     Referring provider: Ernesto Vaz          Assessment and Recommendations:     Brett Negro is a 22 year old male who presents for further evaluation of headache.    His headache presentation meets criteria for chronic migraine without aura. His headache history is complicated by history of adrenoleukodystrophy, adrenal insufficiency.     His neurologic examination is overall intact today.  He gets routine MRI brains to monitor his adrenoleukodystrophy. Reviewed his last MRI from August 2022 which is overall reassuring. I did not recommend further evaluation for secondary cause of headache today.     We discussed the following treatment strategy:  - For acute treatment of mild headache, he may use naproxen as needed, not to exceed 14 days per month to avoid medication overuse.   - For acute treatment of moderate to severe headache, he may use Ubrelvy 100 mg taken at onset of headache with a repeat dose after 2 hours if needed.   - He may use Cefaly and Nerivio devices for acute headache treatment.   - For nausea with headache, he may use ondansetron 4 mg tablet as needed.  Metoclopramide has also historically been helpful.   - Should he require anything for intractable headache rescue, he has previously responded well to a combination of ketorolac, metoclopramide, and diphenhydramine.    His current frequency and severity of headaches warrant prevention.  - I recommend he continue with Aimovig 140 mg subcutaneous injection every 30 days.  - I recommend a retrial of botulinum toxin injections following a chronic migraine protocol.  Previously he was getting 155 units but has not received injections in over 7 months.  We will work to obtain prior authorization for this.    I will see him back in clinic in a few weeks for first  round of injections.  We will plan for a routine follow-up in 6 to 9 months to monitor effectiveness.    Marzena Bhandari PA-C  Headache Neurology  [facility]            Chief Complaint:     Chief Complaint   Patient presents with    Headache     Other migraine without status migrainous            History is obtained from the patient and medical record.      Brett Negro is a 22 year old male who presents for further evaluation of headaches.     He reports a history of chronic migraines for the past 5 years or so.   Headache can be intense pressure at bilateral sides of the head. Headache can also be unilateral, though laterality can vary. He can also have generalized holocephalic headache pain. Headaches can be pounding or throbbing, and he can also occasionally have sharp or stabbing headaches.   He can have neck tension or jaw pain at times. Typical headaches are a 5-7/10 but they can reach an 11/10 when severe.   Typically headaches last several hours in duration. Rarely do they last several days in a row.    He has associated nausea without vomiting. He reports photophobia, phonophobia, some osmophobia. He will feel generally weak and fatigued.     He currently reports 30/30 headache days per month with 16-20/30 severe headache days per month. He notes headaches have been worse the past few months. Over the summer, he would have reported 8/30 headache days per month with 2-4/30 severe headache days per month.     He reports his vision can be more out of focus when he has a headache. He denies typical aura or other visual disturbances.   He denies focal paresthesias or weakness, unilateral autonomic features, positional component, exertional component.     For acute treatment of headaches, he will use Cefaly and Nerivio. He will use Ubrelvy 100 mg and this is effective. Occasionally he will use naproxen or Excedrin. Acetaminophen is not very effective.    For headache prevention, he  currently uses Aimovig 150 mg monthly. He previously was also doing Botox injections which were helpful, [place]     Last week was seen in the ER for migraine lasting 1+ week.  He thinks the combination of increased mental workload, dietary changes, weather changes may have provoked his recent bout of migraine.     He is trying to follow a keto diet.     He has a history of adrenal insufficiency.     He denies family history of headache.     Current headache treatments:  Acute therapies:  - Excedrin, naproxen  - Ubrelvy 100 mg - effective   - Cefaly  - Nerivio  - Cyproheptadine - uses for weather-related headaches, somewhat helpful, causes drowsiness    Preventative therapies:  - Aimovig 140 mg - helpful  - Amlodipine (hypertension)  - Magnesium  - Riboflavin     Supportive therapies:    Previous treatments tried:  Acute therapies:  - Sumatriptan - fatigue, rebound headache  - Eletriptan - fatigue  - Nurtec - helpful but likes Ubrelvy better    Preventative therapies:  - Venlafaxine - not effective   - Topiramate - not effective  - Depakote - cognitive slowing, worse depression  - Botox 155 units - last done about 7 months ago    Supportive therapies:            Past Medical History:     Past Medical History:   Diagnosis Date    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      Depression, anxiety, Bipolar 2 disorder, cPTSD - follows with therapist weekly     History of adrenoleukodystrophy, s/p stem cell transplant  Astrocytoma removed when he was a child          Past Surgical History:     Past Surgical History:   Procedure Laterality Date    BRAIN TUMOR EXCISION      Age 1 1/2             Social History:     Social History     Socioeconomic History    Marital status: Patient Declined     Spouse name: Not on file    Number of children: Not on file    Years of education: Not on file    Highest education level: Not on file   Occupational History    Occupation: Student, Ski  Instructor   Tobacco Use    Smoking status: Former     Types: Cigarettes    Smokeless tobacco: Never   Substance and Sexual Activity    Alcohol use: Yes     Comment: Occassionally    Drug use: Yes     Types: Marijuana    Sexual activity: Not on file   Other Topics Concern    Not on file   Social History Narrative    Not on file     Social Determinants of Health     Financial Resource Strain: Not on file   Food Insecurity: Not on file   Transportation Needs: Not on file   Physical Activity: Not on file   Stress: Not on file   Social Connections: Not on file   Interpersonal Safety: Not on file   Housing Stability: Not on file             Family History:     Family History   Problem Relation Age of Onset    Other - See Comments Mother         ALD carrier    Anxiety Disorder Sister     Mental Illness Paternal Grandmother     Hypertension Paternal Grandfather              Allergies:      Allergies   Allergen Reactions    Cherry Other (See Comments)     Other reaction(s): Edema-Airway    Daucus Carota Anaphylaxis     Raw carrots    Other reaction(s): Edema-Airway   Raw carrots    [place] Pulp Other (See Comments), GI Disturbance and Shortness Of Breath     Throat itching, trouble breathing    Other reaction(s): Resp Distress   Throat itching, trouble breathing   Throat itching, trouble breathing   Throat itching, trouble breathing    [place] Oil Itching    Diethyl Toluamide (Deet) Hives    Nuts Itching    Prunus Persica Itching    Penicillins Dizziness, Hives, Itching and Rash     Other reaction(s): Dizziness             Medications:     Current Outpatient Medications:     amLODIPine (NORVASC) 2.5 MG tablet, Take 2.5 mg by mouth, Disp: , Rfl:     Calcium Carb-Cholecalciferol 600-10 MG-MCG CAPS, Take by mouth., Disp: , Rfl:     cyproheptadine (PERIACTIN) 4 MG tablet, One tab or 1/2 a tab at bedtime nightly or as needed for weather related headaches, Disp: , Rfl:     erenumab-aooe (AIMOVIG) 140 MG/ML injection, Inject 140 mg  "Subcutaneous every 30 days, Disp: , Rfl:     fludrocortisone (FLORINEF) 0.1 MG tablet, Take 1 tablet (0.1 mg) by mouth daily, Disp: 60 tablet, Rfl: 3    hydrocortisone (CORTEF) 5 MG tablet, , Disp: , Rfl:     melatonin 3 MG tablet, Take 6 mg by mouth daily, Disp: , Rfl:     minoxidil (LONITEN) 2.5 MG tablet, Take one-half tablet per day to equal 1.25mg per day, Disp: 30 tablet, Rfl: 1    SOLU-CORTEF 100 MG injection, Inject 100 mg into the muscle once, Disp: , Rfl:     UBRELVY 100 MG tablet, take 1 tablet by mouth once daily if needed, Disp: , Rfl:     lurasidone (LATUDA) 20 MG TABS tablet, , Disp: , Rfl:           Physical Exam:   /83   Pulse 54   Ht 1.753 m (5' 9\")   Wt 72.6 kg (160 lb)   SpO2 98%   BMI 23.63 kg/m       General: In no acute distress.  Head: Normocephalic, atraumatic. No radiating pain with palpation over the supraorbital notches, occipital nerves. Temporal pulses intact.   Neck: Normal range of motion with lateral head movements and neck flexion.  Eyes: No conjunctival injection, no scleral icterus.     Neurologic Exam:  Mental Status Exam: Alert, awake and oriented to situation. No dysarthria. Speech of normal fluency.  Cranial Nerves: Fundoscopic exam with clear disc margins bilaterally. PERRLA, EOMs intact, no nystagmus, facial movements symmetric, facial sensation intact to light touch, hearing intact to conversation, trapezius and SCMs 5/5 bilaterally, tongue midline and fully mobile. No tongue atrophy or fasciculations.   Motor: Normal tone in all four extremities, no atrophy or fasciculations. 5/5 strength bilaterally in shoulder abduction, elbow flexion and extension, wrist flexion and extension, hip flexion, knee flexion and extension, dorsiflexion and plantarflexion. No tremors or abnormal movements noted.  Sensory: Sensation intact to light touch on arms and legs bilaterally.   Coordination: Finger-nose-finger intact bilaterally. Rapidly alternating movements intact " bilaterally in the upper extremities. Normal finger tapping bilaterally. Normal Romberg.  Reflexes: 2+ and symmetric in triceps, biceps, brachioradialis, patellar, and Achilles. Plantar reflexes are downgoing bilaterally.  Gait: Normal gait. Able to toe and heel walk. Normal tandem gait.            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     COMPARISON: MRI Brain 6/14/21     TECHNIQUE/PROTOCOL: Standard adult brain MRI protocol pre and post contrast   and multivoxel 2D MRI spectroscopy.     BRAIN FINDINGS:     Redemonstrated are postsurgical changes associated with suboccipital   craniotomy and resection of the prior cerebellar mass. Unchanged areas of   elevated T2/FLAIR signal abnormality adjacent to the posterior fossa   resection cavity. No abnormal enhancement. No change from the prior.     Other brain Parenchyma:  No hemorrhage, cerebral edema, acute cortical   infarction, or midline shift. Prominent supratentorial perivascular spaces   are again noted.   Ventricles and Sulci: Mildly prominent for age, unchanged.   Extra-Axial Spaces: No extra-axial fluid collection.   Basal Cisterns: Normal.   Intracranial Flow-Voids: Normal.     Paranasal Sinuses: Normal.   Mastoid Sinuses: Normal.   Orbits: Normal.   Cranium: Suboccipital craniotomy.       MR SPECTROSCOPY:   2D MR spectroscopy was performed to evaluate the areas of signal   abnormality adjacent to the posterior fossa resection cavity in the medial   cerebellum on the left using echo times of 30 and 135 ms. This area   demonstrates decreased NABIL and elevated lactate peak consistent with   gliosis, and unchanged.     IMPRESSION:   Stable exam compared to 6/14/21. Post surgical change of the posterior   fossa  related to prior mass resection. No evidence of disease progression.

## 2023-11-07 NOTE — NURSING NOTE
"Brett Negro is a 22 year old male who presents for:  Chief Complaint   Patient presents with    Headache     Other migraine without status migrainous         Initial Vitals:  /83   Pulse 54   Ht 1.753 m (5' 9\")   Wt 72.6 kg (160 lb)   SpO2 98%   BMI 23.63 kg/m   Estimated body mass index is 23.63 kg/m  as calculated from the following:    Height as of this encounter: 1.753 m (5' 9\").    Weight as of this encounter: 72.6 kg (160 lb).. Body surface area is 1.88 meters squared. BP completed using cuff size: small regular    Kathia Rathai   "

## 2023-11-07 NOTE — TELEPHONE ENCOUNTER
Spoke with patient and informed that the PA for Botox has been placed, order is placed and CAM team has been working on it. Did inform patient's father that he can contact insurance again to see where they are at in the process for expediting.    Megan FERRARA CMA  [facility] - Neurology

## 2023-11-08 NOTE — TELEPHONE ENCOUNTER
[facility] Call Center    Phone Message    May a detailed message be left on voicemail: yes     Reason for Call: Other: Patients father called states that PA message can be cancelled, he figured it out     Action Taken: Other: cs neurology    Travel Screening: Not Applicable

## 2023-11-08 NOTE — TELEPHONE ENCOUNTER
[facility] Call Center    Phone Message    May a detailed message be left on voicemail: yes     Reason for Call: Order(s): Other:   Reason for requested: Botox PA   Date needed: ASAP  Provider name: Dr. Bhandari     Patient's father Selvin is requesting a call back regarding status of Botox pre-authorization. Selvin is stating that his insurance has not received the PA. Please call Selvin at [phone].     Action Taken: Message routed to:  Other: [facility]     Travel Screening: Not Applicable

## 2023-11-13 NOTE — TELEPHONE ENCOUNTER
Spoke with father of patient, confirmed PA for Botox was good to go and to keep apppointment on 11/21 with LINNEA Montalvo.     Megan FERRARA CMA  [facility] - Neurology.

## 2023-11-13 NOTE — TELEPHONE ENCOUNTER
Health Call Center    Phone Message    May a detailed message be left on voicemail: yes     Reason for Call: Patient's father, Selvin, requests a call back to with status of prior approval from [facility].  Has Dr. Bhandari's office received approval for Botox for upcoming appointment 11/21/23    Action Taken:  Neurology    Travel Screening: Not Applicable

## 2023-11-15 ENCOUNTER — OFFICE VISIT (OUTPATIENT)
Dept: NEUROLOGY | Facility: CLINIC | Age: 23
End: 2023-11-15
Payer: COMMERCIAL

## 2023-11-15 ENCOUNTER — TELEPHONE (OUTPATIENT)
Dept: NEUROLOGY | Facility: CLINIC | Age: 23
End: 2023-11-15

## 2023-11-15 DIAGNOSIS — G43.709 CHRONIC MIGRAINE WITHOUT AURA WITHOUT STATUS MIGRAINOSUS, NOT INTRACTABLE: Primary | ICD-10-CM

## 2023-11-15 PROCEDURE — 64615 CHEMODENERV MUSC MIGRAINE: CPT

## 2023-11-15 NOTE — TELEPHONE ENCOUNTER
[facility] Call Center    Phone Message    May a detailed message be left on voicemail: yes     Reason for Call: Symptoms or Concerns     If patient has red-flag symptoms, warm transfer to triage line    Current symptom or concern: Worsening migraines    Symptoms have been present for:  Couple day(s)    Has patient previously been seen for this? Yes    By Marzena Bhandari    Are there any new or worsening symptoms? Yes: Pt's father Selvin is requesting a call back in regards to the pt having worsening migraines and is very sensitive to light currently. Please call Selvin back to advise at # [phone].    Action Taken: Message routed to:  Other:  Neurology     Travel Screening: Not Applicable

## 2023-11-15 NOTE — TELEPHONE ENCOUNTER
Called pt's father back and he has been having a terrible migraine for the past day and a half.     He has been using aimovig, ubrelvy cefaly and nerivio.  They are wondering if they can come in to clinic today to get the botox.  PA is approved for Marzena Bhandari only.       Writer let them know that provider is booked and will not be in clinic until Tuesday.  Father still asking to see if provider can fit pt in today before she is off.     Let them know message will be sent to provider to see if she can fit pt in for botox or if she recommends any other treatments at this time.     Aliza LANDAVERDE, RN, BSN  [facility]

## 2023-11-15 NOTE — TELEPHONE ENCOUNTER
Marzena Ok with pt coming in for Botox today.     Called father, and he will reach out to son and have him come in today.     Added pt to schedule.     Aliza LANDAVERDE RN, BSN  [facility] Neurology     Results reviewed. Patient had a upper endoscopy (EGD)    abnormal. Eosinophilic Esophagitis.    Follow up in office with Karlee Andino NP.     Send letter to patient Yes.     Additional recommendations: recommend using swallowed budesonide d/t ongoing dysphagia despite PPI use.

## 2023-11-15 NOTE — LETTER
11/15/2023         RE: Brett Negro  [address]  [place] [id number]        Dear Colleague,    Thank you for referring your patient, Brett Negro, to the [facility]. Please see a copy of my visit note below.    [facility]  Botulinum Toxin Procedure    Marzena Bhandari PA-C  Headache Neurology    November 15, 2023    Procedure: OnabotulinumtoxinA injections for chronic migraine  Indication: Chronic migraine    Brett Negro suffers from severe intractable headaches. He was referred by Marzena Bhandari PA-C for onabotulinumtoxinA injections for headache.      His headaches meet criteria for chronic migraine. Headache can be intense pressure at bilateral sides of the head. Headache can also be unilateral, though laterality can vary. He can also have generalized holocephalic headache pain. Headaches can be pounding or throbbing, and he can also occasionally have sharp or stabbing headaches.   He can have neck tension or jaw pain at times. Typical headaches are a 5-7/10 but they can reach an 11/10 when severe. Typically headaches last several hours in duration. Rarely do they last several days in a row.  He has associated nausea without vomiting. He reports photophobia, phonophobia, some osmophobia. He will feel generally weak and fatigued.     Prior to initiation of botulinum toxin injections, Brett reported 30/30 headache days per month with 16-20/30 severe headache days per month. His headaches are quite disabling and often interfere with his ability to function normally.    We are reinstating injections today. His last round of Botox was [place] state. Historically, Botox has been helpful for his migraines.     He has attempted other migraine prophylactic treatments in the past, which have included: venlafaxine, topiramate, Depakote.     He currently takes Aimovig 140 mg, Cefaly, Nerivio, magnesium, riboflavin, for headache  treatment    Patient's pain was assessed prior to the procedure. He rated his pain today as 2-3 out of 10.      The procedure was explained to the patient. Benefits of the treatment were discussed including headache and migraine reduction. Risks of the procedure were reviewed including but not limited to pain, bruising, bleeding, infection, and weakness of muscles injected or those distal to injection sites. Alternatives were discussed. The patient voiced understanding of the risks and benefits. All questions answered and patient consented to proceed.    Procedural Pause: Procedural pause was conducted to verify correct patient identity, procedure to be performed, correct side and site, correct patient position, and special requirements. Appropriate hand hygiene was utilized, and each injection site was prepped with alcohol wipes or Chloraprep swab.     Procedure Details:   200 units of onabotulinumtoxinA was diluted in 4 mL 0.9% normal saline.   A total of 150 units of onabotulinumtoxinA were injected using 30 gauge 0.5 inch needles into the muscles listed below. 50 units of onabotulinumtoxinA were wasted.     Injection Sites: Total = 150 units onabotulinumtoxinA     Procerus muscles - 5 units into the procerus muscle (5 units total)     muscles - 5 units into the left  muscle and 5 units into the right  muscle (1 injection site per muscle) (10 units total)    Frontalis muscles - 5 units into the left superior frontalis muscle and 5 units into the right superior frontalis muscle (2 injection sites per muscle) (10 units total)    Temporalis muscles - 12.5 units into the left temporalis muscle and 12.5 units into the right temporalis muscle (2 injection sites per muscle) (25 units total)    Occipitalis muscles - 12.5 units into the left occipitalis muscle and 12.5 units into the right occipitalis muscle (2 injection sites per muscle) (25 units total)    Splenius Capitis muscles - 12.5  units into the left splenius capitis muscle and 12.5 units into the right splenius capitis muscle (2 injection sites per muscle, divided into 2/3 anteriorly and 1/3 posteriorly) (25 units total)      Trapezius muscles - 25 units into the left trapezius muscle and 25 units into the right trapezius muscle (3 injection sites per muscle, divided into 5 units, 10 units, 10 units, medial to lateral) (50 units total)      Patient tolerated the procedure well without immediate complications. He will follow up in clinic for assessment of the effectiveness of treatment. He did not report any change in his pain level after the botulinumtoxinA injection procedure.      Bakari Bhandari PA-C  Headache Neurology  [facility] Neurology [facility]      Again, thank you for allowing me to participate in the care of your patient.        Sincerely,        BAKARI BHANDARI PA-C

## 2023-11-15 NOTE — PROGRESS NOTES
[facility]  Botulinum Toxin Procedure    Marzena Bhandari PA-C  Headache Neurology    November 15, 2023    Procedure: OnabotulinumtoxinA injections for chronic migraine  Indication: Chronic migraine    Brett Negro suffers from severe intractable headaches. He was referred by Marzena Bhandari PA-C for onabotulinumtoxinA injections for headache.      His headaches meet criteria for chronic migraine. Headache can be intense pressure at bilateral sides of the head. Headache can also be unilateral, though laterality can vary. He can also have generalized holocephalic headache pain. Headaches can be pounding or throbbing, and he can also occasionally have sharp or stabbing headaches.   He can have neck tension or jaw pain at times. Typical headaches are a 5-7/10 but they can reach an 11/10 when severe. Typically headaches last several hours in duration. Rarely do they last several days in a row.  He has associated nausea without vomiting. He reports photophobia, phonophobia, some osmophobia. He will feel generally weak and fatigued.     Prior to initiation of botulinum toxin injections, Brett reported 30/30 headache days per month with 16-20/30 severe headache days per month. His headaches are quite disabling and often interfere with his ability to function normally.    We are reinstating injections today. [place] Historically, Botox has been helpful for his migraines.     He has attempted other migraine prophylactic treatments in the past, which have included: venlafaxine, topiramate, Depakote.     He currently takes Aimovig 140 mg, Cefaly, Nerivio, magnesium, riboflavin, for headache treatment    Patient's pain was assessed prior to the procedure. He rated his pain today as 2-3 out of 10.      The procedure was explained to the patient. Benefits of the treatment were discussed including headache and migraine reduction. Risks of the procedure were reviewed including  but not limited to pain, bruising, bleeding, infection, and weakness of muscles injected or those distal to injection sites. Alternatives were discussed. The patient voiced understanding of the risks and benefits. All questions answered and patient consented to proceed.    Procedural Pause: Procedural pause was conducted to verify correct patient identity, procedure to be performed, correct side and site, correct patient position, and special requirements. Appropriate hand hygiene was utilized, and each injection site was prepped with alcohol wipes or Chloraprep swab.     Procedure Details:   200 units of onabotulinumtoxinA was diluted in 4 mL 0.9% normal saline.   A total of 150 units of onabotulinumtoxinA were injected using 30 gauge 0.5 inch needles into the muscles listed below. 50 units of onabotulinumtoxinA were wasted.     Injection Sites: Total = 150 units onabotulinumtoxinA     Procerus muscles - 5 units into the procerus muscle (5 units total)     muscles - 5 units into the left  muscle and 5 units into the right  muscle (1 injection site per muscle) (10 units total)    Frontalis muscles - 5 units into the left superior frontalis muscle and 5 units into the right superior frontalis muscle (2 injection sites per muscle) (10 units total)    Temporalis muscles - 12.5 units into the left temporalis muscle and 12.5 units into the right temporalis muscle (2 injection sites per muscle) (25 units total)    Occipitalis muscles - 12.5 units into the left occipitalis muscle and 12.5 units into the right occipitalis muscle (2 injection sites per muscle) (25 units total)    Splenius Capitis muscles - 12.5 units into the left splenius capitis muscle and 12.5 units into the right splenius capitis muscle (2 injection sites per muscle, divided into 2/3 anteriorly and 1/3 posteriorly) (25 units total)      Trapezius muscles - 25 units into the left trapezius muscle and 25 units into the right  trapezius muscle (3 injection sites per muscle, divided into 5 units, 10 units, 10 units, medial to lateral) (50 units total)      Patient tolerated the procedure well without immediate complications. He will follow up in clinic for assessment of the effectiveness of treatment. He did not report any change in his pain level after the botulinumtoxinA injection procedure.      Marzena Bhandari PA-C  Headache Neurology  [facility]

## 2024-02-01 DIAGNOSIS — E71.529 ALD (ADRENOLEUKODYSTROPHY) (H): ICD-10-CM

## 2024-02-01 RX ORDER — MINOXIDIL 2.5 MG/1
TABLET ORAL
Qty: 30 TABLET | Refills: 1 | Status: SHIPPED | OUTPATIENT
Start: 2024-02-01

## 2024-07-12 ENCOUNTER — MYC MEDICAL ADVICE (OUTPATIENT)
Dept: NEUROLOGY | Facility: CLINIC | Age: 24
End: 2024-07-12
Payer: COMMERCIAL

## 2024-07-12 ENCOUNTER — TELEPHONE (OUTPATIENT)
Dept: NEUROLOGY | Facility: CLINIC | Age: 24
End: 2024-07-12
Payer: COMMERCIAL

## 2024-07-12 NOTE — TELEPHONE ENCOUNTER
[facility] Call Center    Phone Message    May a detailed message be left on voicemail: yes     Reason for Call: Other: Brett calling to request a call back to discuss scheduling a botox appointment. Brett stated that he has moved around a bit since his last injection on 11/15/23. Brett stated that he has been keeping up with his botox appointments. [facility]. Please call Brett to discuss at your earliest convenience.     Action Taken: Message routed to:  Other: [facility]    Travel Screening: Not Applicable     Date of Service:

## 2024-07-12 NOTE — TELEPHONE ENCOUNTER
Called to speak with patient about scheduling next Botox Appt. Offered patient spots recommended by provider and patient un able to make. Patient is able to make it to an appointment early morning 8/22/24. Advised patient that this would be with  who also works here at our [place] location. Patient okay with this for now, and would like to continue seeing Marzena going forward. Advised patient that when he comes in for appointment to let the rooming staff person know you would like the following appt to be set with provider Elisabet. Patient understands and is very happy that our clinic is able to accommodate him. Patient also wondering if he is able to get injections sooner that 12 weeks. Advised patient that insurance covers injections every 12 weeks but it is a possibility that the provider could place an order/request for sooner injections however this would not be gauntleted. Patient understands and will follow up on question during next appt.       Nancy Calderon MA

## 2024-07-18 DIAGNOSIS — G43.709 CHRONIC MIGRAINE WITHOUT AURA WITHOUT STATUS MIGRAINOSUS, NOT INTRACTABLE: Primary | ICD-10-CM

## 2024-08-17 ENCOUNTER — MYC REFILL (OUTPATIENT)
Dept: TRANSPLANT | Facility: CLINIC | Age: 24
End: 2024-08-17
Payer: COMMERCIAL

## 2024-08-17 DIAGNOSIS — G43.709 CHRONIC MIGRAINE WITHOUT AURA WITHOUT STATUS MIGRAINOSUS, NOT INTRACTABLE: Primary | ICD-10-CM

## 2024-08-19 RX ORDER — UBROGEPANT 100 MG/1
100 TABLET ORAL
Qty: 16 TABLET | Refills: 11 | Status: SHIPPED | OUTPATIENT
Start: 2024-08-19

## 2024-08-22 ENCOUNTER — OFFICE VISIT (OUTPATIENT)
Dept: NEUROLOGY | Facility: CLINIC | Age: 24
End: 2024-08-22
Payer: COMMERCIAL

## 2024-08-22 DIAGNOSIS — G43.709 CHRONIC MIGRAINE WITHOUT AURA WITHOUT STATUS MIGRAINOSUS, NOT INTRACTABLE: Primary | ICD-10-CM

## 2024-08-22 PROCEDURE — 64615 CHEMODENERV MUSC MIGRAINE: CPT | Performed by: PSYCHIATRY & NEUROLOGY

## 2024-08-22 NOTE — LETTER
8/22/2024      Brett Negro  [address]  [place] [address]      Dear Colleague,    Thank you for referring your patient, Brett Negro, to the [facility] NEUROLOGY CLINICS [facility]. Please see a copy of my visit note below.    [facility]  Botulinum Toxin Procedure    Nara Fakl MD  Headache Neurology    August 22, 2024    Procedure:  OnabotulinumtoxinA injections for chronic migraine  Indication:  Chronic migraine    Brett Negro suffers from severe intractable headaches. He was referred by Marzena hBandari PA-C for onabotulinumtoxinA injections for headache.       His headaches meet criteria for chronic migraine. Headache can be intense pressure at bilateral sides of the head. Headache can also be unilateral, though laterality can vary. He can also have generalized holocephalic headache pain. Headaches can be pounding or throbbing, and he can also occasionally have sharp or stabbing headaches.   He can have neck tension or jaw pain at times. Typical headaches are a 5-7/10 but they can reach an 11/10 when severe. Typically headaches last several hours in duration. Rarely do they last several days in a row.  He has associated nausea without vomiting. He reports photophobia, phonophobia, some osmophobia. He will feel generally weak and fatigued.      Prior to initiation of botulinum toxin injections, Brett reported 30/30 headache days per month with 16-20/30 severe headache days per month. His headaches are quite disabling and often interfere with his ability to function normally.    Date of last injections: 11/15/2024, second round within the [facility]    Mr. Negro reports 8 headache days per month currently, with 4 severe headache days per month.  He has not noticed a wearing off phenomenon prior to this round of botulinum toxin injections.    Botulinum toxin injections have improved their functioning. It has provided the opportunity to have better  tolerance to the noise at work.     He has attempted other migraine prophylactic treatments in the past, which have included: venlafaxine, topiramate, Depakote.      He currently takes Aimovig 140 mg, Cefaly, Nerivio, magnesium, riboflavin, for headache treatment    Mr. Ruizs pain was assessed prior to the procedure.  He rated his pain today as 2 out of 10.    Procedural Pause: Procedural pause was conducted to verify correct patient identity, procedure to be performed, correct side and site, correct patient position, and special requirements. Appropriate hand hygiene was utilized, and each injection site was prepped with alcohol wipe or Chloraprep swab.     Procedure Details: 200 units of onabotulinumtoxinA was diluted in 4 mL 0.9% normal saline. A total of 150 units of onabotulinumtoxinA were injected using 30 gauge 0.5 in needles into the muscles listed below. 50 units of onabotulinumtoxinA were wasted.     Injection Sites: Total = 150 units onabotulinumtoxinA      and Procerus muscles - 5 units into the left and right corrugators and 5 units into the procerus (15 units total)    Frontalis muscles - 5 units into the left superior frontalis and 5 units into the right superior frontalis (2 injection sites per muscle) (10 units total)    Temporalis muscles - 12.5 units into the left temporalis muscle and 12.5 units into the right temporalis muscle (2 injection sites per muscle) (25 units total)    Occipitalis muscles - 12.5 units into the left occipitalis muscle and 12.5 units into the right occipitalis muscle (2 injection sites per muscle) (25 units total)    Splenius Capitis muscles - 12.5 units into the left splenius capitis muscle and 12.5 units into the right splenius capitis muscle (2 injection sites per muscle, divided into 2/3 anteriorly and 1/3 posteriorly) (25 units total)      Trapezius muscles - 25 units into the left trapezius muscle and 25 units into the right trapezius muscle (3 injection  sites per muscle, divided 5 units, 10 units, 10 units, medial to lateral) (50 units total)    Mr. Negro tolerated the procedure well without immediate complications.  He will follow up in clinic for assessment of the effectiveness of treatment.  He did not report any change in his pain level after the botulinumtoxinA injection procedure.    Nara Falk MD  Headache Neurology  [facility]       Again, thank you for allowing me to participate in the care of your patient.        Sincerely,        Nara Falk MD

## 2024-08-22 NOTE — PROGRESS NOTES
[facility]  Botulinum Toxin Procedure    Nara Falk MD  Headache Neurology    August 22, 2024    Procedure:  OnabotulinumtoxinA injections for chronic migraine  Indication:  Chronic migraine    Brett Negro suffers from severe intractable headaches. He was referred by Marzena Bhandari PA-C for onabotulinumtoxinA injections for headache.       His headaches meet criteria for chronic migraine. Headache can be intense pressure at bilateral sides of the head. Headache can also be unilateral, though laterality can vary. He can also have generalized holocephalic headache pain. Headaches can be pounding or throbbing, and he can also occasionally have sharp or stabbing headaches.   He can have neck tension or jaw pain at times. Typical headaches are a 5-7/10 but they can reach an 11/10 when severe. Typically headaches last several hours in duration. Rarely do they last several days in a row.  He has associated nausea without vomiting. He reports photophobia, phonophobia, some osmophobia. He will feel generally weak and fatigued.      Prior to initiation of botulinum toxin injections, Brett reported 30/30 headache days per month with 16-20/30 severe headache days per month. His headaches are quite disabling and often interfere with his ability to function normally.    Date of last injections: 11/15/2024, second round within the [facility]    Mr. Negro reports 8 headache days per month currently, with 4 severe headache days per month.  He has not noticed a wearing off phenomenon prior to this round of botulinum toxin injections.    Botulinum toxin injections have improved their functioning. It has provided the opportunity to have better tolerance to the noise at work.     He has attempted other migraine prophylactic treatments in the past, which have included: venlafaxine, topiramate, Depakote.      He currently takes Aimovig 140 mg, Cefaly, Nerivio, magnesium, riboflavin, for headache  treatment    Mr. Negro's pain was assessed prior to the procedure.  He rated his pain today as 2 out of 10.    Procedural Pause: Procedural pause was conducted to verify correct patient identity, procedure to be performed, correct side and site, correct patient position, and special requirements. Appropriate hand hygiene was utilized, and each injection site was prepped with alcohol wipe or Chloraprep swab.     Procedure Details: 200 units of onabotulinumtoxinA was diluted in 4 mL 0.9% normal saline. A total of 150 units of onabotulinumtoxinA were injected using 30 gauge 0.5 in needles into the muscles listed below. 50 units of onabotulinumtoxinA were wasted.     Injection Sites: Total = 150 units onabotulinumtoxinA      and Procerus muscles - 5 units into the left and right corrugators and 5 units into the procerus (15 units total)    Frontalis muscles - 5 units into the left superior frontalis and 5 units into the right superior frontalis (2 injection sites per muscle) (10 units total)    Temporalis muscles - 12.5 units into the left temporalis muscle and 12.5 units into the right temporalis muscle (2 injection sites per muscle) (25 units total)    Occipitalis muscles - 12.5 units into the left occipitalis muscle and 12.5 units into the right occipitalis muscle (2 injection sites per muscle) (25 units total)    Splenius Capitis muscles - 12.5 units into the left splenius capitis muscle and 12.5 units into the right splenius capitis muscle (2 injection sites per muscle, divided into 2/3 anteriorly and 1/3 posteriorly) (25 units total)      Trapezius muscles - 25 units into the left trapezius muscle and 25 units into the right trapezius muscle (3 injection sites per muscle, divided 5 units, 10 units, 10 units, medial to lateral) (50 units total)    Mr. Negro tolerated the procedure well without immediate complications.  He will follow up in clinic for assessment of the effectiveness of treatment.  He did  not report any change in his pain level after the botulinumtoxinA injection procedure.    Nara Falk MD  Headache Neurology  [facility]

## 2024-08-27 ENCOUNTER — MYC MEDICAL ADVICE (OUTPATIENT)
Dept: NEUROLOGY | Facility: CLINIC | Age: 24
End: 2024-08-27
Payer: COMMERCIAL

## 2024-08-27 DIAGNOSIS — G43.709 CHRONIC MIGRAINE WITHOUT AURA WITHOUT STATUS MIGRAINOSUS, NOT INTRACTABLE: Primary | ICD-10-CM

## 2024-08-28 NOTE — TELEPHONE ENCOUNTER
In message below patient confirms that he is no longer taking aimovig. RN removed aimovig from medication list.    Ajovy rx pended below with preferred pharmacy, please review and sign. Pended rx is for 30 day supply with 5 refills, please adjust as appropriate.     Your last appt with patient was 11/2023, do you want a follow up? He also received botox injections for migraine with Dr. Falk, only has future botox appts scheduled at this time.     Thank you  Bjorn Cooper RN, BSN  [facility]

## 2024-09-09 NOTE — TELEPHONE ENCOUNTER
Please schedule pt if they return message.     Aliza LANDAVERDE RN, BSN  [facility]

## 2024-09-13 ASSESSMENT — HEADACHE IMPACT TEST (HIT 6)
HIT6 TOTAL SCORE: 70
HOW OFTEN HAVE YOU FELT TOO TIRED TO WORK BECAUSE OF YOUR HEADACHES: VERY OFTEN
HOW OFTEN DO HEADACHES LIMIT YOUR DAILY ACTIVITIES: VERY OFTEN
WHEN YOU HAVE HEADACHES HOW OFTEN IS THE PAIN SEVERE: VERY OFTEN
HOW OFTEN HAVE YOU FELT FED UP OR IRRITATED BECAUSE OF YOUR HEADACHES: ALWAYS
WHEN YOU HAVE A HEADACHE HOW OFTEN DO YOU WISH YOU COULD LIE DOWN: ALWAYS
HOW OFTEN DID HEADACHS LIMIT CONCENTRATION ON WORK OR DAILY ACTIVITY: VERY OFTEN

## 2024-09-13 ASSESSMENT — MIGRAINE DISABILITY ASSESSMENT (MIDAS)
HOW MANY DAYS IN THE PAST 3 MONTHS HAVE YOU HAD A HEADACHE: 50
HOW MANY DAYS DID YOU MISS WORK OR SCHOOL BECAUSE OF HEADACHES: 4
ON A SCALE FROM 0-10 ON AVERAGE HOW PAINFUL WERE HEADACHES: 6
HOW MANY DAYS WAS HOUSEWORK PRODUCTIVITY CUT IN HALF DUE TO HEADACHES: 40
HOW OFTEN WERE SOCIAL ACTIVITIES MISSED DUE TO HEADACHES: 6
HOW MANY DAYS WAS YOUR PRODUCTIVITY CUT IN HALF BECAUSE OF HEADACHES: 30
TOTAL SCORE: 86
HOW MANY DAYS DID YOU NOT DO HOUSEWORK BECAUSE OF HEADACHES: 6

## 2024-09-16 ENCOUNTER — VIRTUAL VISIT (OUTPATIENT)
Dept: NEUROLOGY | Facility: CLINIC | Age: 24
End: 2024-09-16
Payer: COMMERCIAL

## 2024-09-16 DIAGNOSIS — G43.709 CHRONIC MIGRAINE WITHOUT AURA WITHOUT STATUS MIGRAINOSUS, NOT INTRACTABLE: Primary | ICD-10-CM

## 2024-09-16 PROCEDURE — 99215 OFFICE O/P EST HI 40 MIN: CPT | Mod: 95

## 2024-09-16 RX ORDER — MAGNESIUM OXIDE 400 MG/1
400 TABLET ORAL DAILY
COMMUNITY

## 2024-09-16 RX ORDER — AMLODIPINE BESYLATE 5 MG/1
1 TABLET ORAL
COMMUNITY
Start: 2024-09-13

## 2024-09-16 ASSESSMENT — PAIN SCALES - GENERAL: PAINLEVEL: MILD PAIN (3)

## 2024-09-16 NOTE — PROGRESS NOTES
Virtual Visit Details    Type of service:  Video Visit     Originating Location (pt. Location): Home    Distant Location (provider location):  Off-site  Platform used for Video Visit: Christina

## 2024-09-16 NOTE — LETTER
9/16/2024      Brett Negro  [address]  [place] [address]      Dear Colleague,    Thank you for referring your patient, Brett Negro, to the [facility] NEUROLOGY CLINICS [facility]. Please see a copy of my visit note below.    [facility]    Headache Neurology Progress Note    September 16, 2024    Assessment and Plan:   Brett is a 23 year old male who presents for follow up of chronic migraine without aura.     Reviewed discussing workplace accommodations/medical leave with his employer.  Fax number for clinic provided should he need any paperwork completed. Discussed that this may require a separate video/telephone visit to review paperwork.    We discussed the following treatment strategy:  - For acute treatment of mild headache, he may use naproxen as needed, not to exceed 14 days per month to avoid medication overuse.   - For acute treatment of moderate to severe headache, he may use Ubrelvy 100 mg taken at onset of headache with a repeat dose after 2 hours if needed.   - For nausea with headache, he may use ondansetron 4 mg as needed.   - He may also use Cefaly and Nerivio as needed for acute headache treatment.    His current frequency and severity of headaches warrant prevention.  - I recommend he continue with Botox 150 units every 12 weeks. Next appointment is 12/5/24 with Dr. Falk.   - I recommend he continue with Ajovy 225 mg subcutaneous injection every 30 days. This has been effective for further reducing his migraine frequency and severity.     The longitudinal plan of care for the diagnosis(es)/condition(s) as documented were addressed during this visit. Due to the added complexity in care, I will continue to support Brett in the subsequent management and with ongoing continuity of care.     Follow up in 1 year or sooner if needed.     I spent 40 minutes today on patient care, chart review, and documentation.       Marzena Bhandari,  "RIANNA  Headache Neurology  [facility]      Subjective:    Brett presents for follow up of chronic migraine without aura.     Headache description from initial encounter (11/7/2023): \"Headache can be intense pressure at bilateral sides of the head. Headache can also be unilateral, though laterality can vary. He can also have generalized holocephalic headache pain. Headaches can be pounding or throbbing, and he can also occasionally have sharp or stabbing headaches.   He can have neck tension or jaw pain at times. Typical headaches are a 5-7/10 but they can reach an 11/10 when severe.   Typically headaches last several hours in duration. Rarely do they last several days in a row.  He has associated nausea without vomiting. He reports photophobia, phonophobia, some osmophobia. He will feel generally weak and fatigued. \"    At baseline, Brett reported 30/30 headache days per month with 16-20/30 severe headache days per month.     Had flu/COVID vaccine over the weekend. Has some mild to moderate headache since but this is improving.     Switched from Aimovig to Ajovy (per previous [place] provider) and he finds this very helpful. Aimovig 140 mg lost effectiveness over time and wearing off sooner. He can tell Ajovy is more effective.     Botox is still very effective for managing his migraines. Continues with injections with Dr. Falk.    Brett currently reports 15+/30 headache days per month, with 7/30 severe headache days per month.     For acute treatment of headache, he will use naproxen or Excedrin, and Ubrelvy 100 mg. Ubrelvy is very effective.     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 He does wonder about work accommodations for time off if needed for migraines.      Current headache treatments:  Acute therapies:  - Excedrin, naproxen  - Ubrelvy 100 mg - effective, works faster than Nurtec  - Cefaly  - Nerivio  - Cyproheptadine " 2-4 mg - uses for weather-related headaches, somewhat helpful, causes drowsiness  - Ondansetron 4 mg      Preventative therapies:  - Botox 150 units - effective  - Ajovy 225 mg - effective   - Amlodipine (hypertension)  - Magnesium  - Riboflavin      Supportive therapies:     Previous treatments tried:  Acute therapies:  - Sumatriptan - fatigue, rebound headache  - Eletriptan - fatigue  - Nurtec - helpful but likes Ubrelvy better    Preventative therapies:  - Venlafaxine - not effective   - Topiramate - not effective  - Depakote - cognitive slowing, worse depression  - Aimovig 140 mg -  x2 years, helpful, but possibly lost effectiveness over time      Supportive therapies:        11/6/2023     1:41 PM 9/13/2024    11:58 AM   HIT-6   When you have headaches, how often is the pain severe 11 11   How often do headaches limit your ability to do usual daily activities including household work, work, school, or social activities? 11 11   When you have a headache, how often do you wish you could lie down? 11 13   In the past 4 weeks, how often have you felt too tired to do work or daily activities because of your headaches 11 11   In the past 4 weeks, how often have you felt fed up or irritated because of your headaches 13 13   In the past 4 weeks, how often did headaches limit your ability to concentrate on work or daily activities 11 11   HIT-6 Total Score 68 70           11/6/2023     1:50 PM 9/13/2024    12:09 PM   MIDAS - in the past three months:   On how many days did you miss work or school because of your headaches? 10 4   How many days was your productivity at work or school reduced by half or more because of your headaches? 20 30   On how many days did you not do household work because of your headaches? 5 6   How many days was your productivity in household work reduced by half or more because of your headaches? 10 40   On how many days did you miss family, social, or leisure activities because of your  headaches? 20 6   On how many days did you have a headache? 20 50   On a scale of 0-10, on average how painful were these headaches? 10 6   MIDAS Score 65 (IV - Severe Disability) 86 (IV - Severe Disability)        Objective:    Vitals: There were no vitals taken for this visit.  General: Cooperative, NAD  Neurologic Exam:  Mental Status: Fully alert, attentive and oriented. Speech is clear and fluent.   Cranial Nerves: Facial movements symmetric.   Motor: No abnormal movements.            Virtual Visit Details    Type of service:  Video Visit     Originating Location (pt. Location): Home    Distant Location (provider location):  Off-site  Platform used for Video Visit: AmWell      Again, thank you for allowing me to participate in the care of your patient.        Sincerely,        BAKARI CARBALLO PA-C

## 2024-09-16 NOTE — PROGRESS NOTES
"[facility]    Headache Neurology Progress Note    September 16, 2024    Assessment and Plan:   Brett is a 23 year old male who presents for follow up of chronic migraine without aura.     Reviewed discussing workplace accommodations/medical leave with his employer.  Fax number for clinic provided should he need any paperwork completed. Discussed that this may require a separate video/telephone visit to review paperwork.    We discussed the following treatment strategy:  - For acute treatment of mild headache, he may use naproxen as needed, not to exceed 14 days per month to avoid medication overuse.   - For acute treatment of moderate to severe headache, he may use Ubrelvy 100 mg taken at onset of headache with a repeat dose after 2 hours if needed.   - For nausea with headache, he may use ondansetron 4 mg as needed.   - He may also use Cefaly and Nerivio as needed for acute headache treatment.    His current frequency and severity of headaches warrant prevention.  - I recommend he continue with Botox 150 units every 12 weeks. Next appointment is 12/5/24 with Dr. Falk.   - I recommend he continue with Ajovy 225 mg subcutaneous injection every 30 days. This has been effective for further reducing his migraine frequency and severity.     The longitudinal plan of care for the diagnosis(es)/condition(s) as documented were addressed during this visit. Due to the added complexity in care, I will continue to support Brett in the subsequent management and with ongoing continuity of care.     Follow up in 1 year or sooner if needed.     I spent 40 minutes today on patient care, chart review, and documentation.       Marzena Bhandari PA-C  Headache Neurology  [facility] [place]      Subjective:    Brett presents for follow up of chronic migraine without aura.     Headache description from initial encounter (11/7/2023): \"Headache can be intense pressure at bilateral sides " "of the head. Headache can also be unilateral, though laterality can vary. He can also have generalized holocephalic headache pain. Headaches can be pounding or throbbing, and he can also occasionally have sharp or stabbing headaches.   He can have neck tension or jaw pain at times. Typical headaches are a 5-7/10 but they can reach an 11/10 when severe.   Typically headaches last several hours in duration. Rarely do they last several days in a row.  He has associated nausea without vomiting. He reports photophobia, phonophobia, some osmophobia. He will feel generally weak and fatigued. \"    At baseline, Brett reported 30/30 headache days per month with 16-20/30 severe headache days per month.     Had flu/COVID vaccine over the weekend. Has some mild to moderate headache since but this is improving.     Switched from Aimovig to Ajovy (per previous [place] provider) and he finds this very helpful. Aimovig 140 mg lost effectiveness over time and wearing off sooner. He can tell Ajovy is more effective.     Botox is still very effective for managing his migraines. Continues with injections with Dr. Falk.    Brett currently reports 15+/30 headache days per month, with 7/30 severe headache days per month.     For acute treatment of headache, he will use naproxen or Excedrin, and Ubrelvy 100 mg. Ubrelvy is very effective.     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 He does wonder about work accommodations for time off if needed for migraines.      Current headache treatments:  Acute therapies:  - Excedrin, naproxen  - Ubrelvy 100 mg - effective, works faster than Nurtec  - Cefaly  - Nerivio  - Cyproheptadine 2-4 mg - uses for weather-related headaches, somewhat helpful, causes drowsiness  - Ondansetron 4 mg      Preventative therapies:  - Botox 150 units - effective  - Ajovy 225 mg - effective   - Amlodipine (hypertension)  - Magnesium  - Riboflavin      Supportive " therapies:     Previous treatments tried:  Acute therapies:  - Sumatriptan - fatigue, rebound headache  - Eletriptan - fatigue  - Nurtec - helpful but likes Ubrelvy better    Preventative therapies:  - Venlafaxine - not effective   - Topiramate - not effective  - Depakote - cognitive slowing, worse depression  - Aimovig 140 mg -  x2 years, helpful, but possibly lost effectiveness over time      Supportive therapies:        11/6/2023     1:41 PM 9/13/2024    11:58 AM   HIT-6   When you have headaches, how often is the pain severe 11 11   How often do headaches limit your ability to do usual daily activities including household work, work, school, or social activities? 11 11   When you have a headache, how often do you wish you could lie down? 11 13   In the past 4 weeks, how often have you felt too tired to do work or daily activities because of your headaches 11 11   In the past 4 weeks, how often have you felt fed up or irritated because of your headaches 13 13   In the past 4 weeks, how often did headaches limit your ability to concentrate on work or daily activities 11 11   HIT-6 Total Score 68 70           11/6/2023     1:50 PM 9/13/2024    12:09 PM   MIDAS - in the past three months:   On how many days did you miss work or school because of your headaches? 10 4   How many days was your productivity at work or school reduced by half or more because of your headaches? 20 30   On how many days did you not do household work because of your headaches? 5 6   How many days was your productivity in household work reduced by half or more because of your headaches? 10 40   On how many days did you miss family, social, or leisure activities because of your headaches? 20 6   On how many days did you have a headache? 20 50   On a scale of 0-10, on average how painful were these headaches? 10 6   MIDAS Score 65 (IV - Severe Disability) 86 (IV - Severe Disability)        Objective:    Vitals: There were no vitals taken for  this visit.  General: Cooperative, NAD  Neurologic Exam:  Mental Status: Fully alert, attentive and oriented. Speech is clear and fluent.   Cranial Nerves: Facial movements symmetric.   Motor: No abnormal movements.

## 2024-09-16 NOTE — NURSING NOTE
Current patient location:  at work  -- [place]    Is the patient currently in the state of [place]? YES    Visit mode:VIDEO    If the visit is dropped, the patient can be reconnected by: VIDEO VISIT: Text to cell phone:   Telephone Information:   Mobile [phone]       Will anyone else be joining the visit? NO  (If patient encounters technical issues they should call [phone]    How would you like to obtain your AVS? MyChart    Are changes needed to the allergy or medication list? No    Are refills needed on medications prescribed by this physician? NO    Rooming Documentation:  Questionnaire(s) completed      Reason for visit: RECHECK    Cass LOVE

## 2024-09-27 ENCOUNTER — MYC MEDICAL ADVICE (OUTPATIENT)
Dept: NEUROLOGY | Facility: CLINIC | Age: 24
End: 2024-09-27
Payer: COMMERCIAL

## 2024-10-03 NOTE — TELEPHONE ENCOUNTER
Received message from patient  that they spoke with pt an dhelped submit his DOS of 8/22/24 to botox via portal.     She gave pt her number to call for any future copay assistance claim questions.     SHANE Abrams, BSN  [facility]

## 2024-11-09 ENCOUNTER — HEALTH MAINTENANCE LETTER (OUTPATIENT)
Age: 24
End: 2024-11-09

## 2024-11-21 ENCOUNTER — OFFICE VISIT (OUTPATIENT)
Dept: URGENT CARE | Facility: URGENT CARE | Age: 24
End: 2024-11-21
Payer: COMMERCIAL

## 2024-11-21 ENCOUNTER — NURSE TRIAGE (OUTPATIENT)
Dept: TRANSPLANT | Facility: CLINIC | Age: 24
End: 2024-11-21
Payer: COMMERCIAL

## 2024-11-21 VITALS
HEIGHT: 69 IN | DIASTOLIC BLOOD PRESSURE: 72 MMHG | WEIGHT: 155 LBS | TEMPERATURE: 98.1 F | BODY MASS INDEX: 22.96 KG/M2 | RESPIRATION RATE: 15 BRPM | SYSTOLIC BLOOD PRESSURE: 116 MMHG | OXYGEN SATURATION: 99 % | HEART RATE: 76 BPM

## 2024-11-21 DIAGNOSIS — R07.0 THROAT PAIN: Primary | ICD-10-CM

## 2024-11-21 LAB
DEPRECATED S PYO AG THROAT QL EIA: NEGATIVE
GROUP A STREP BY PCR: NOT DETECTED

## 2024-11-21 NOTE — PROGRESS NOTES
"Chief Complaint   Patient presents with    Urgent Care     Concern of strep, red spots on tonsils.        ASSESSMENT/PLAN:  Brett was seen today for urgent care.    Diagnoses and all orders for this visit:    Throat pain  -     Streptococcus A Rapid Screen w/Reflex to PCR - Clinic Collect    Strep negative.  Likely viral.  Reassuring exam and vitalsSymptomatic cares and expected length of symptoms discussed at length and outlined in AVS  Return precautions also discussed    Matteo Carson PA-C      SUBJECTIVE:  Brett is a 23 year old male who presents to urgent care with approximately 4 to 5 days of a sore throat.  Seem like he was getting better a day or 2 ago but then worsened again this morning.  Feels better now after taking some cough drops.  Has been using Chloraseptic.  Getting  tomorrow.  Minimal cough.  Slight nasal congestion.  Works with kids.    ROS: Pertinent ROS neg other than the symptoms noted above in the HPI.     OBJECTIVE:  /72   Pulse 76   Temp 98.1  F (36.7  C) (Temporal)   Resp 15   Ht 1.753 m (5' 9\")   Wt 70.3 kg (155 lb)   SpO2 99%   BMI 22.89 kg/m     GENERAL: alert and no distress  EYES: Eyes grossly normal to inspection, PERRL and conjunctivae and sclerae normal  HENT: Mild oropharynx erythema, no exudate, tonsils 1+ nose and mouth without ulcers or lesions  NECK: no adenopathy, no asymmetry, masses, or scars  RESP: lungs clear to auscultation - no rales, rhonchi or wheezes  CV: regular rate and rhythm, normal S1 S2, no S3 or S4, no murmur, click or rub, no peripheral edema     DIAGNOSTICS    Results for orders placed or performed in visit on 11/21/24   Streptococcus A Rapid Screen w/Reflex to PCR - Clinic Collect     Status: Normal    Specimen: Throat; Swab   Result Value Ref Range    Group A Strep antigen Negative Negative        Current Outpatient Medications   Medication Sig Dispense Refill    amLODIPine (NORVASC) 5 MG tablet Take 1 tablet by mouth daily at 2 " pm.      Calcium Carb-Cholecalciferol 600-10 MG-MCG CAPS Take by mouth.      cyproheptadine (PERIACTIN) 4 MG tablet One tab or 1/2 a tab at bedtime nightly or as needed for weather related headaches      fludrocortisone (FLORINEF) 0.1 MG tablet Take 1 tablet (0.1 mg) by mouth daily 60 tablet 3    Fremanezumab-vfrm (AJOVY) 225 MG/1.5ML SOAJ Inject 225 mg subcutaneously every 30 days. 1.5 mL 11    hydrocortisone (CORTEF) 5 MG tablet       melatonin 3 MG tablet Take 6 mg by mouth daily      minoxidil (LONITEN) 2.5 MG tablet Take one-half tablet per day to equal 1.25mg per day 30 tablet 1    ondansetron (ZOFRAN) 4 MG tablet Take 1 tablet (4 mg) by mouth every 8 hours as needed for nausea (with migraine) 20 tablet 3    UBRELVY 100 MG tablet Take 1 tablet (100 mg) by mouth at onset of headache (migraine) May repeat dose after 2 hours if needed. Maximum daily dose 200 mg/24 hr 16 tablet 11    magnesium oxide (MAG-OX) 400 MG tablet Take 400 mg by mouth daily.      SOLU-CORTEF 100 MG injection Inject 100 mg into the muscle once       Current Facility-Administered Medications   Medication Dose Route Frequency Provider Last Rate Last Admin    Botulinum Toxin Type A (BOTOX) 200 units injection 150 Units  150 Units Intramuscular Q12 weeks    150 Units at 08/22/24 0946      Patient Active Problem List   Diagnosis    Adrenal insufficiency (H)    Adrenoleukodystrophy (H)    Growth hormone deficiency (H)      Past Medical History:   Diagnosis Date    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     Past Surgical History:   Procedure Laterality Date    BRAIN TUMOR EXCISION      Age 1 1/2     Family History   Problem Relation Age of Onset    Other - See Comments Mother         ALD carrier    Anxiety Disorder Sister     Mental Illness Paternal Grandmother     Hypertension Paternal Grandfather      Social History     Tobacco Use    Smoking status: Former     Types: Cigarettes    Smokeless tobacco: Never   Substance Use Topics     Alcohol use: Yes     Comment: Occassionally              The plan of care was discussed with the patient. They understand and agree with the course of treatment prescribed. A printed summary was given including instructions and medications.  The use of Dragon/[employer] dictation services may have been used to construct the content in this note; any grammatical or spelling errors are non-intentional. Please contact the author of this note directly if you are in need of any clarification.

## 2024-11-21 NOTE — TELEPHONE ENCOUNTER
"S-(situation): Patient calling to report 5 to 6 days of sore throat.     B-(background): Started last Friday 11/15.     A-(assessment): Reports pain has been up to an 8/10 and has woken him up from sleep. Has been managing pain control with lidocaine throat spray and that brings the pain down to a 6/10. No other symptoms. No fevers.     R-(recommendations): Advised office visit today for strep throat. Patient was unable to locate e-visit option on [employer]. Advised urgent care and transferred to scheduling in case patient could find appointment at another clinic.     Tawana Smiley RN          Reason for Disposition   SEVERE sore throat pain    Additional Information   Negative: SEVERE difficulty breathing (e.g., struggling for each breath, speaks in single words)   Negative: Sounds like a life-threatening emergency to the triager   Negative: Throat culture results, call about   Negative: Productive cough is main symptom   Negative: Runny nose is main symptom   Negative: Drooling or spitting out saliva (because can't swallow)   Negative: Unable to open mouth completely   Negative: Drinking very little and has signs of dehydration (e.g., no urine > 12 hours, very dry mouth, very lightheaded)   Negative: Patient sounds very sick or weak to the triager   Negative: Difficulty breathing (per caller) but not severe   Negative: Fever > 103 F (39.4 C)   Negative: Refuses to drink anything for > 12 hours    Answer Assessment - Initial Assessment Questions  1. ONSET: \"When did the throat start hurting?\" (Hours or days ago)       A few days   2. SEVERITY: \"How bad is the sore throat?\" (Scale 1-10; mild, moderate or severe)      6  3. STREP EXPOSURE: \"Has there been any exposure to strep within the past week?\" If Yes, ask: \"What type of contact occurred?\"       No  4.  VIRAL SYMPTOMS: \"Are there any symptoms of a cold, such as a runny nose, cough, hoarse voice or red eyes?\"       Hoarse voice  5. FEVER: \"Do you have a fever?\" If " "Yes, ask: \"What is your temperature, how was it measured, and when did it start?\"      No fevers  6. PUS ON THE TONSILS: \"Is there pus on the tonsils in the back of your throat?\"      Red all around the back of his throat   7. OTHER SYMPTOMS: \"Do you have any other symptoms?\" (e.g., difficulty breathing, headache, rash)      Congested nose   8. PREGNANCY: \"Is there any chance you are pregnant?\" \"When was your last menstrual period?\"      NO    Protocols used: Sore Throat-A-OH    "

## 2024-12-05 ENCOUNTER — OFFICE VISIT (OUTPATIENT)
Dept: NEUROLOGY | Facility: CLINIC | Age: 24
End: 2024-12-05
Payer: COMMERCIAL

## 2024-12-05 DIAGNOSIS — G43.709 CHRONIC MIGRAINE WITHOUT AURA WITHOUT STATUS MIGRAINOSUS, NOT INTRACTABLE: Primary | ICD-10-CM

## 2024-12-05 NOTE — PROGRESS NOTES
[facility]  Botulinum Toxin Procedure    Nara Falk MD  Headache Neurology    December 5, 2024    Procedure:  OnabotulinumtoxinA injections for chronic migraine  Indication:  Chronic migraine    Brett Negro suffers from severe intractable headaches. He was referred by Marzena Bhandari PA-C for onabotulinumtoxinA injections for headache.       His headaches meet criteria for chronic migraine. Headache can be intense pressure at bilateral sides of the head. Headache can also be unilateral, though laterality can vary. He can also have generalized holocephalic headache pain. Headaches can be pounding or throbbing, and he can also occasionally have sharp or stabbing headaches.   He can have neck tension or jaw pain at times. Typical headaches are a 5-7/10 but they can reach an 11/10 when severe. Typically headaches last several hours in duration. Rarely do they last several days in a row.  He has associated nausea without vomiting. He reports photophobia, phonophobia, some osmophobia. He will feel generally weak and fatigued.     Prior to initiation of botulinum toxin injections, Brett reported 30/30 headache days per month with 16-20/30 severe headache days per month. His headaches are quite disabling and often interfere with his ability to function normally.     Date of last injections: 08/22/2024, third round within the [facility]     Mr. Negro reports 10-15 headache days per month currently, with 8 severe headache days per month.  He has not noticed a wearing off phenomenon prior to this round of botulinum toxin injections. Has taken Ubrelvy for all headaches, limiting progression to full blown migraine, resolving it successfully.    Botulinum toxin injections have improved their functioning.     He has attempted other migraine prophylactic treatments in the past, which have included: venlafaxine, topiramate, Depakote.      He currently takes Aimovig 140 mg, Cefaly, Nerivio,  magnesium, riboflavin, for headache treatment    Mr. Negro's pain was assessed prior to the procedure.  He rated his pain today as 1-2 out of 10.    Procedural Pause: Procedural pause was conducted to verify correct patient identity, procedure to be performed, correct side and site, correct patient position, and special requirements. Appropriate hand hygiene was utilized, and each injection site was prepped with alcohol wipe or Chloraprep swab.     Procedure Details: 200 units of onabotulinumtoxinA was diluted in 4 mL 0.9% normal saline. A total of 150 units of onabotulinumtoxinA were injected using 30 gauge 0.5 in needles into the muscles listed below. 50 units of onabotulinumtoxinA were wasted.     Injection Sites: Total = 150 units onabotulinumtoxinA      and Procerus muscles - 5 units into the left and right corrugators and 5 units into the procerus (15 units total)    Frontalis muscles - 5 units into the left superior frontalis and 5 units into the right superior frontalis (2 injection sites per muscle) (10 units total)    Temporalis muscles - 12.5 units into the left temporalis muscle and 12.5 units into the right temporalis muscle (2 injection sites per muscle) (25 units total)    Occipitalis muscles - 12.5 units into the left occipitalis muscle and 12.5 units into the right occipitalis muscle (2 injection sites per muscle) (25 units total)    Splenius Capitis muscles - 12.5 units into the left splenius capitis muscle and 12.5 units into the right splenius capitis muscle (2 injection sites per muscle, divided into 2/3 anteriorly and 1/3 posteriorly) (25 units total)      Trapezius muscles - 25 units into the left trapezius muscle and 25 units into the right trapezius muscle (3 injection sites per muscle, divided 5 units, 10 units, 10 units, medial to lateral) (50 units total)    Mr. Negro tolerated the procedure well without immediate complications.  He will follow up in clinic for assessment of the  effectiveness of treatment.  He did not report any change in his pain level after the botulinumtoxinA injection procedure.    Nara Falk MD  Headache Neurology  [facility]

## 2024-12-05 NOTE — LETTER
12/5/2024      Brett Negro  [address]  [place] [facility] [address]      Dear Colleague,    Thank you for referring your patient, Brett Negro, to the [facility]. Please see a copy of my visit note below.    [facility]  Botulinum Toxin Procedure    Nara Falk MD  Headache Neurology    December 5, 2024    Procedure:  OnabotulinumtoxinA injections for chronic migraine  Indication:  Chronic migraine    Brett Negro suffers from severe intractable headaches. He was referred by Marzena Bhandari PA-C for onabotulinumtoxinA injections for headache.       His headaches meet criteria for chronic migraine. Headache can be intense pressure at bilateral sides of the head. Headache can also be unilateral, though laterality can vary. He can also have generalized holocephalic headache pain. Headaches can be pounding or throbbing, and he can also occasionally have sharp or stabbing headaches.   He can have neck tension or jaw pain at times. Typical headaches are a 5-7/10 but they can reach an 11/10 when severe. Typically headaches last several hours in duration. Rarely do they last several days in a row.  He has associated nausea without vomiting. He reports photophobia, phonophobia, some osmophobia. He will feel generally weak and fatigued.     Prior to initiation of botulinum toxin injections, Brett reported 30/30 headache days per month with 16-20/30 severe headache days per month. His headaches are quite disabling and often interfere with his ability to function normally.     Date of last injections: 08/22/2024, third round within the [facility]     Mr. Negro reports 10-15 headache days per month currently, with 8 severe headache days per month.  He has not noticed a wearing off phenomenon prior to this round of botulinum toxin injections. Has taken Ubrelvy for all headaches, limiting progression to full blown migraine, resolving it  successfully.    Botulinum toxin injections have improved their functioning.     He has attempted other migraine prophylactic treatments in the past, which have included: venlafaxine, topiramate, Depakote.      He currently takes Aimovig 140 mg, Cefaly, Nerivio, magnesium, riboflavin, for headache treatment    Mr. Ruizs pain was assessed prior to the procedure.  He rated his pain today as 1-2 out of 10.    Procedural Pause: Procedural pause was conducted to verify correct patient identity, procedure to be performed, correct side and site, correct patient position, and special requirements. Appropriate hand hygiene was utilized, and each injection site was prepped with alcohol wipe or Chloraprep swab.     Procedure Details: 200 units of onabotulinumtoxinA was diluted in 4 mL 0.9% normal saline. A total of 150 units of onabotulinumtoxinA were injected using 30 gauge 0.5 in needles into the muscles listed below. 50 units of onabotulinumtoxinA were wasted.     Injection Sites: Total = 150 units onabotulinumtoxinA      and Procerus muscles - 5 units into the left and right corrugators and 5 units into the procerus (15 units total)    Frontalis muscles - 5 units into the left superior frontalis and 5 units into the right superior frontalis (2 injection sites per muscle) (10 units total)    Temporalis muscles - 12.5 units into the left temporalis muscle and 12.5 units into the right temporalis muscle (2 injection sites per muscle) (25 units total)    Occipitalis muscles - 12.5 units into the left occipitalis muscle and 12.5 units into the right occipitalis muscle (2 injection sites per muscle) (25 units total)    Splenius Capitis muscles - 12.5 units into the left splenius capitis muscle and 12.5 units into the right splenius capitis muscle (2 injection sites per muscle, divided into 2/3 anteriorly and 1/3 posteriorly) (25 units total)      Trapezius muscles - 25 units into the left trapezius muscle and 25  units into the right trapezius muscle (3 injection sites per muscle, divided 5 units, 10 units, 10 units, medial to lateral) (50 units total)    Mr. Negro tolerated the procedure well without immediate complications.  He will follow up in clinic for assessment of the effectiveness of treatment.  He did not report any change in his pain level after the botulinumtoxinA injection procedure.    Nara Falk MD  Headache Neurology  [facility]       Again, thank you for allowing me to participate in the care of your patient.        Sincerely,        Nara Falk MD

## 2024-12-12 ENCOUNTER — ANCILLARY PROCEDURE (OUTPATIENT)
Dept: GENERAL RADIOLOGY | Facility: CLINIC | Age: 24
End: 2024-12-12
Attending: PHYSICIAN ASSISTANT
Payer: COMMERCIAL

## 2024-12-12 ENCOUNTER — OFFICE VISIT (OUTPATIENT)
Dept: URGENT CARE | Facility: URGENT CARE | Age: 24
End: 2024-12-12
Payer: COMMERCIAL

## 2024-12-12 VITALS
HEIGHT: 69 IN | SYSTOLIC BLOOD PRESSURE: 124 MMHG | HEART RATE: 63 BPM | TEMPERATURE: 98.1 F | DIASTOLIC BLOOD PRESSURE: 79 MMHG | WEIGHT: 160 LBS | OXYGEN SATURATION: 97 % | BODY MASS INDEX: 23.7 KG/M2 | RESPIRATION RATE: 16 BRPM

## 2024-12-12 DIAGNOSIS — J06.9 UPPER RESPIRATORY TRACT INFECTION, UNSPECIFIED TYPE: Primary | ICD-10-CM

## 2024-12-12 NOTE — PROGRESS NOTES
Upper respiratory tract infection, unspecified type  - XR Chest 2 Views  - COVID-19 Virus (Coronavirus) by PCR Nasopharyngeal      General Tips for All Ages:    Rest and Hydration:  Allow yourself the time to rest and prioritize hydration.  Stay well-hydrated with water, clear broths, and soothing herbal teas.    Symptomatic Relief:  Over-the-counter (OTC) medications can help alleviate symptoms.  Consider non-pharmacological options like a warm saltwater gargle for soothing a sore throat.    For Infants and Children (Under 2 Years):    Nasal Saline Drops:  Use saline drops to clear nasal congestion in infants.  Administer 1-2 drops in each nostril before feeding or bedtime.    Humidifier:  Place a cool-mist humidifier in the room to ease congestion.  Remember to clean the humidifier regularly.  Okay to use [employer]'s     Acetaminophen (if applicable):  Use acetaminophen for fever and discomfort.  Follow dosing guidelines based on your child's weight.  Avoid aspirin in children to prevent Reye's syndrome.    Contact Pediatrician:  If symptoms persist or worsen, or if your child shows signs of respiratory distress, contact your pediatrician.    For Children (2-12 Years):    Nasal Saline Solution:  Encourage the use of saline nasal spray for congestion relief.  Teach your child to blow their nose gently.    Honey (for those over 1 year):  Use honey to soothe a cough (1-2 teaspoons as needed).  Do not give honey to children under 1 year due to the risk of botulism.    Acetaminophen or Ibuprofen:  Use acetaminophen or ibuprofen for fever and pain relief.  Follow dosing guidelines based on your child's weight.    Fluid Intake:  Ensure your child drinks warm liquids like herbal teas and broths.  Monitor their fluid intake to keep them hydrated.    For Adolescents and Adults (12 Years and Older):    Decongestants (Pseudoephedrine/Phenylephrine):  Consider OTC decongestants for nasal congestion.  Use with caution if you  have hypertension, and avoid prolonged use.    Cough Suppressants (Dextromethorphan):  Choose a cough suppressant for persistent cough.  Avoid in children under 12 years; use honey instead.  Follow package instructions and avoid multiple medications with similar ingredients.    Pain Relievers (Acetaminophen or Ibuprofen):  Use acetaminophen or ibuprofen for pain and fever.  Follow package instructions.  Take ibuprofen with food to minimize stomach upset.    Rest and Isolation:  Prioritize rest and stay at home to prevent spreading the infection.    For All Ages:    Hydration:  Ensure you stay well-hydrated; monitor urine color to gauge hydration.    Hand Hygiene:  Wash hands with soap and water for at least 20 seconds.  Use hand  with at least 60% alcohol if soap is unavailable.    Avoid Tobacco Smoke:  Stay away from tobacco smoke, which can worsen respiratory symptoms.    Seek Medical Attention:  If symptoms worsen or if you experience difficulty breathing, seek medical attention promptly.  Look out for red flag symptoms like persistent high fever, severe headache, or chest pain.    Patient advised to return to clinic for reevaluation (either UC or PCP) if symptoms do not improve in 7 days. Patient educated on red flag symptoms and asked to go directly to the ED if these symptoms present themselves.     Remember, this guide is meant to assist you, but individual cases may vary. If you have concerns or if symptoms persist, don't hesitate to reach out to a healthcare professional. Wishing you a speedy recovery!      RIANNA Smith [facility]    Subjective   24 year old who presents to clinic today for the following health issues:    Cough, Urgent Care, Nasal Congestion, Dizziness, Shortness of Breath, and Back Pain       HPI     Acute Illness  Acute illness concerns: Cough, congestion, shortness of breath, and low back pain- Some blood in the mucus   Onset/Duration: Friday night    Symptoms:  Fever: Felt feverish but not measured.   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 Patient's fiance has been sick also. Patient has not done any home Covid-19 tests.   Therapies tried and outcome: [place]     Review of Systems   Review of Systems   See HPI    Objective    Temp: 98.1  F (36.7  C) Temp src: Temporal BP: 124/79 Pulse: 63   Resp: 16 SpO2: 97 %       Physical Exam   Physical Exam  Constitutional:       General: He is not in acute distress.     Appearance: Normal appearance. He is normal weight. He is not ill-appearing, toxic-appearing or diaphoretic.   HENT:      Head: Normocephalic and atraumatic.      Right Ear: Tympanic membrane, ear canal and external ear normal. There is no impacted cerumen.      Left Ear: Tympanic membrane, ear canal and external ear normal. There is no impacted cerumen.      Nose: Congestion and rhinorrhea present.      Mouth/Throat:      Pharynx: No oropharyngeal exudate or posterior oropharyngeal erythema.   Cardiovascular:      Rate and Rhythm: Normal rate and regular rhythm.      Pulses: Normal pulses.      Heart sounds: Normal heart sounds. No murmur heard.     No friction rub. No gallop.   Pulmonary:      Effort: Pulmonary effort is normal. No respiratory distress.      Breath sounds: Normal breath sounds. No stridor. No wheezing, rhonchi or rales.   Chest:      Chest wall: No tenderness.   Lymphadenopathy:      Cervical: No cervical adenopathy.   Neurological:      Mental Status: He is alert.   Psychiatric:         Mood and Affect: Mood normal.         Behavior: Behavior normal.         Thought  Content: Thought content normal.         Judgment: Judgment normal.          Results for orders placed or performed in visit on 12/12/24 (from the past 24 hours)   XR Chest 2 Views    Narrative    CHEST TWO VIEWS December 12, 2024 3:00 PM     HISTORY: Acute cough.    COMPARISON: None.      Impression    IMPRESSION: No acute cardiopulmonary disease.    SAVITA LUDWIG MD         SYSTEM ID:  [id number]

## 2025-01-17 ENCOUNTER — TELEPHONE (OUTPATIENT)
Dept: FAMILY MEDICINE | Facility: CLINIC | Age: 25
End: 2025-01-17
Payer: COMMERCIAL

## 2025-01-17 PROBLEM — G43.709 CHRONIC MIGRAINE W/O AURA W/O STATUS MIGRAINOSUS, NOT INTRACTABLE: Status: ACTIVE | Noted: 2018-09-05

## 2025-01-17 PROBLEM — F33.41 MAJOR DEPRESSIVE DISORDER, RECURRENT EPISODE, IN PARTIAL REMISSION: Status: ACTIVE | Noted: 2020-08-23

## 2025-01-17 NOTE — TELEPHONE ENCOUNTER
Attempted to reach pharmacy and phone busy    Bailey PASTOR  RN, BSN  [facility]

## 2025-01-17 NOTE — TELEPHONE ENCOUNTER
[facility] - [place], [place] - [address]  calling and they got both rxs for :    Hydrocortisone 10 mg tablets     And fludrocortisone 0.1mg    They are just checking if he is to take both?    Bailey PASTOR  RN, BSN  [facility]

## 2025-01-17 NOTE — TELEPHONE ENCOUNTER
Yes,    He is currently taking dose, one is for daily dosing, the other is for stress dosing.    Rivera Layne PA-C

## 2025-01-21 NOTE — TELEPHONE ENCOUNTER
Called [facility] pharmacy.  Reviewed this message with pharmacist.  They will fill both meds.  SHANE Bowen

## 2025-03-06 ENCOUNTER — OFFICE VISIT (OUTPATIENT)
Dept: NEUROLOGY | Facility: CLINIC | Age: 25
End: 2025-03-06
Payer: COMMERCIAL

## 2025-03-06 DIAGNOSIS — G43.709 CHRONIC MIGRAINE W/O AURA W/O STATUS MIGRAINOSUS, NOT INTRACTABLE: Primary | ICD-10-CM

## 2025-03-06 NOTE — LETTER
3/6/2025      Brett Negro  [address]  [facility] [address]      Dear Colleague,    Thank you for referring your patient, Brett Negro, to the [facility]. Please see a copy of my visit note below.    [facility]  Botulinum Toxin Procedure    Nara Falk MD  Headache Neurology    March 6, 2025    Procedure:  OnabotulinumtoxinA injections for chronic migraine  Indication:  Chronic migraine    Mr. Negro suffers from severe intractable headaches.  He was referred by Marzena Bhandari PA-C for onabotulinumtoxinA injections for headache.  Risks, benefits, and alternatives were discussed.  All questions were answered and consent given.  He decided to proceed with the injections.     His headaches meet criteria for chronic migraine. Headache can be intense pressure at bilateral sides of the head. Headache can also be unilateral, though laterality can vary. He can also have generalized holocephalic headache pain. Headaches can be pounding or throbbing, and he can also occasionally have sharp or stabbing headaches.   He can have neck tension or jaw pain at times. Typical headaches are a 5-7/10 but they can reach an 11/10 when severe. Typically headaches last several hours in duration. Rarely do they last several days in a row.  He has associated nausea without vomiting. He reports photophobia, phonophobia, some osmophobia. He will feel generally weak and fatigued.      Prior to initiation of botulinum toxin injections, Brett reported 30/30 headache days per month with 16-20/30 severe headache days per month. His headaches are quite disabling and often interfere with his ability to function normally.     Date of last injections: 12/5/2024  Date of last office visit: 9/6/2024    Mr. Negro reports 4-8 headache days per month currently, with 5 severe headache days per month.  He has noticed a wearing off phenomenon prior to this round of botulinum toxin  injections, lasting 2 weeks. This was alleviated by Ajovy administration timing.     Botulinum toxin injections have improved their functioning. They have been able to continue social activities and go on his honeymoon. They have had 15 more days of increased function this last round of Botulinum toxin injections.    He has attempted other migraine prophylactic treatments in the past, which have included: venlafaxine, topiramate, Depakote.      He currently takes Aimovig 140 mg, Cefaly, Nerivio, magnesium, riboflavin, for headache treatment.    Mr. Ruizs pain was assessed prior to the procedure.  He rated his pain today as 1-2 out of 10.    Procedural Pause: Procedural pause was conducted to verify correct patient identity, procedure to be performed, correct side and site, correct patient position, and special requirements. Appropriate hand hygiene was utilized, and each injection site was prepped with alcohol wipe or Chloraprep swab.     Procedure Details: 200 units of onabotulinumtoxinA was diluted in 4 mL 0.9% normal saline. A total of 150 units of onabotulinumtoxinA were injected using 30 gauge 0.5 in needles into the muscles listed below. 50 units of onabotulinumtoxinA were wasted.     Injection Sites: Total = 150 units onabotulinumtoxinA      and Procerus muscles - 5 units into the left and right corrugators and 5 units into the procerus (15 units total)    Frontalis muscles - 5 units into the left superior frontalis and 5 units into the right superior frontalis (2 injection sites per muscle) (10 units total)    Temporalis muscles - 12.5 units into the left temporalis muscle and 12.5 units into the right temporalis muscle (2 injection sites per muscle) (25 units total)    Occipitalis muscles - 12.5 units into the left occipitalis muscle and 12.5 units into the right occipitalis muscle (2 injection sites per muscle) (25 units total)    Splenius Capitis muscles - 12.5 units into the left splenius  capitis muscle and 12.5 units into the right splenius capitis muscle (2 injection sites per muscle, divided into 2/3 anteriorly and 1/3 posteriorly) (25 units total)      Trapezius muscles - 25 units into the left trapezius muscle and 25 units into the right trapezius muscle (3 injection sites per muscle, divided 5 units, 10 units, 10 units, medial to lateral) (50 units total)    Mr. Negro tolerated the procedure well without immediate complications.  He will follow up in clinic for assessment of the effectiveness of treatment.  He did not report any change in his pain level after the botulinumtoxinA injection procedure.    Nara Falk MD  Headache Neurology  [facility]       Again, thank you for allowing me to participate in the care of your patient.        Sincerely,        Nara Falk MD    Electronically signed

## 2025-03-06 NOTE — PROGRESS NOTES
[facility]  Botulinum Toxin Procedure    Nara Falk MD  Headache Neurology    March 6, 2025    Procedure:  OnabotulinumtoxinA injections for chronic migraine  Indication:  Chronic migraine    Mr. Negro suffers from severe intractable headaches.  He was referred by Marzena Bhandari PA-C for onabotulinumtoxinA injections for headache.  Risks, benefits, and alternatives were discussed.  All questions were answered and consent given.  He decided to proceed with the injections.     His headaches meet criteria for chronic migraine. Headache can be intense pressure at bilateral sides of the head. Headache can also be unilateral, though laterality can vary. He can also have generalized holocephalic headache pain. Headaches can be pounding or throbbing, and he can also occasionally have sharp or stabbing headaches.   He can have neck tension or jaw pain at times. Typical headaches are a 5-7/10 but they can reach an 11/10 when severe. Typically headaches last several hours in duration. Rarely do they last several days in a row.  He has associated nausea without vomiting. He reports photophobia, phonophobia, some osmophobia. He will feel generally weak and fatigued.      Prior to initiation of botulinum toxin injections, Brett reported 30/30 headache days per month with 16-20/30 severe headache days per month. His headaches are quite disabling and often interfere with his ability to function normally.     Date of last injections: 12/5/2024  Date of last office visit: 9/6/2024    Mr. Negro reports 4-8 headache days per month currently, with 5 severe headache days per month.  He has noticed a wearing off phenomenon prior to this round of botulinum toxin injections, lasting 2 weeks. This was alleviated by Ajovy administration timing.     Botulinum toxin injections have improved their functioning. They have been able to continue social activities and go on his honeymoon. They have had 15 more days of increased  function this last round of Botulinum toxin injections.    He has attempted other migraine prophylactic treatments in the past, which have included: venlafaxine, topiramate, Depakote.      He currently takes Aimovig 140 mg, Cefaly, Nerivio, magnesium, riboflavin, for headache treatment.    Mr. Ruizs pain was assessed prior to the procedure.  He rated his pain today as 1-2 out of 10.    Procedural Pause: Procedural pause was conducted to verify correct patient identity, procedure to be performed, correct side and site, correct patient position, and special requirements. Appropriate hand hygiene was utilized, and each injection site was prepped with alcohol wipe or Chloraprep swab.     Procedure Details: 200 units of onabotulinumtoxinA was diluted in 4 mL 0.9% normal saline. A total of 150 units of onabotulinumtoxinA were injected using 30 gauge 0.5 in needles into the muscles listed below. 50 units of onabotulinumtoxinA were wasted.     Injection Sites: Total = 150 units onabotulinumtoxinA      and Procerus muscles - 5 units into the left and right corrugators and 5 units into the procerus (15 units total)    Frontalis muscles - 5 units into the left superior frontalis and 5 units into the right superior frontalis (2 injection sites per muscle) (10 units total)    Temporalis muscles - 12.5 units into the left temporalis muscle and 12.5 units into the right temporalis muscle (2 injection sites per muscle) (25 units total)    Occipitalis muscles - 12.5 units into the left occipitalis muscle and 12.5 units into the right occipitalis muscle (2 injection sites per muscle) (25 units total)    Splenius Capitis muscles - 12.5 units into the left splenius capitis muscle and 12.5 units into the right splenius capitis muscle (2 injection sites per muscle, divided into 2/3 anteriorly and 1/3 posteriorly) (25 units total)      Trapezius muscles - 25 units into the left trapezius muscle and 25 units into the right  trapezius muscle (3 injection sites per muscle, divided 5 units, 10 units, 10 units, medial to lateral) (50 units total)    Mr. Negro tolerated the procedure well without immediate complications.  He will follow up in clinic for assessment of the effectiveness of treatment.  He did not report any change in his pain level after the botulinumtoxinA injection procedure.    Nara Falk MD  Headache Neurology  [facility]

## 2025-03-10 ASSESSMENT — PATIENT HEALTH QUESTIONNAIRE - PHQ9: SUM OF ALL RESPONSES TO PHQ QUESTIONS 1-9: 4

## 2025-03-16 ENCOUNTER — HOSPITAL ENCOUNTER (EMERGENCY)
Facility: CLINIC | Age: 25
Discharge: HOME | End: 2025-03-16
Attending: EMERGENCY MEDICINE | Admitting: STUDENT IN AN ORGANIZED HEALTH CARE EDUCATION/TRAINING PROGRAM
Payer: COMMERCIAL

## 2025-03-16 ENCOUNTER — NURSE TRIAGE (OUTPATIENT)
Dept: NURSING | Facility: CLINIC | Age: 25
End: 2025-03-16
Payer: COMMERCIAL

## 2025-03-16 VITALS
DIASTOLIC BLOOD PRESSURE: 95 MMHG | HEART RATE: 65 BPM | TEMPERATURE: 98 F | OXYGEN SATURATION: 99 % | SYSTOLIC BLOOD PRESSURE: 149 MMHG | RESPIRATION RATE: 18 BRPM

## 2025-03-16 DIAGNOSIS — H10.13 ALLERGIC CONJUNCTIVITIS, BILATERAL: ICD-10-CM

## 2025-03-16 PROCEDURE — 99283 EMERGENCY DEPT VISIT LOW MDM: CPT | Performed by: STUDENT IN AN ORGANIZED HEALTH CARE EDUCATION/TRAINING PROGRAM

## 2025-03-16 PROCEDURE — 99282 EMERGENCY DEPT VISIT SF MDM: CPT | Performed by: STUDENT IN AN ORGANIZED HEALTH CARE EDUCATION/TRAINING PROGRAM

## 2025-03-16 ASSESSMENT — ACTIVITIES OF DAILY LIVING (ADL): ADLS_ACUITY_SCORE: 41

## 2025-03-16 NOTE — TELEPHONE ENCOUNTER
"Triage call  Patient calling he is having eye pain it started on Friday and has gradually gotten worse.  He got zyrtec thinking it was allergies then yesterday he go some zaditor eye drop[s and a lubricating eye drop.  It did help a little but did not last long.  This morning when he woke up and the pain was a 10/10 and after he washed his eyes in the shower it is  a  6-7/10.  He states that it takes longer for his eyes to focus on thing.  The eye pain is in his eye and at the corner near the tear drop. White of the eye is normal.    Per protocol see HCP (or PCP triage)  Care advice given.  Verbalizes understanding and agrees with plan.  He will go to the ED    SHANE Dumont [facility] Nurse Advisor  10:09 AM 3/16/2025    Reason for Disposition   MODERATE eye pain or discomfort (e.g., interferes with normal activities or awakens from sleep; more than mild)    Additional Information   Negative: Followed an eye injury   Negative: Eye pain from chemical in the eye   Negative: Eye pain from foreign body in eye   Negative: [1] Tender, red lump or pimple AND [2] located along the eyelid margin   Negative: Has sinus pain or pressure   Negative: SEVERE eye pain   Negative: Complete loss of vision in one or both eyes   Negative: [1] Eyelids are very swollen (shut or almost) AND [2] fever   Negative: [1] Eyelid (outer) is very red AND [2] fever   Negative: [1] Foreign body sensation (\"feels like something is in there\") AND [2] irrigation didn't help   Negative: Vomiting   Negative: Ulcer or sore seen on the cornea (clear center part of the eye)   Negative: [1] Recent eye surgery AND [2] increasing eye pain   Negative: [1] Blurred vision AND [2] new or worsening   Negative: Patient sounds very sick or weak to the triager    Protocols used: Eye Pain and Other Symptoms-A-AH    "

## 2025-03-16 NOTE — ED TRIAGE NOTES
Pt arrives ambulatory to the ED for concerns of bilateral eye pain. Pt states that the pain began Friday afternoon & has been getting progressively worse. Pt is taking eye drops for dry eye & Zatidor eye drops w/ some relief. Pt endorses blurry vision & difficulty focusing his vision but denies other vision changes. Pt denes cough, fever, diarrhea, rash, chest pain & shortness of breath.    HX: adrenoleukodystrophy , chronic migraines, autism, anxiety, depression     Ramila Vick, GINAN  Shift: 1100 - [phone]

## 2025-03-16 NOTE — DISCHARGE INSTRUCTIONS
Your symptoms today are most consistent with an allergic conjunctivitis or eye discomfort due to allergies. We recommend you continue your Zyrtec (cetirizine) 10mg once daily, most people will feel better within a few days of starting allergy medications but for some people it can take up to 2 weeks. You can continue with the lubricating drops as directed for comfort. You can call the eye clinic tomorrow as well to make an appointment for further evaluation. At this time, we have no concern for infection to your eye or the spaces around your eye and do not suspect a foreign body or damage to the eye itself. If symptoms worsen or if you experience anything new or concerning, you may return to the emergency department for further evaluation and management.     Please make an appointment to follow up with Eye Clinic (phone: [phone])

## 2025-03-16 NOTE — ED PROVIDER NOTES
ED Provider Note  [facility]      History     Chief Complaint   Patient presents with    Eye Pain     HPI  Brett Negro is a 24 year old male with a history of chronic migraines who presents to the emergency department with 2 days of eye pain. Patient reports the pain initially started on Friday evening after playing outside with his daughter. Reports he took a 10mg Zyrtec and didn't notice any improvement by the next morning and took another tablet yesterday morning. He has also been using Zaditor drops which he didn't feel any improvement with and then switched to lubricating drops which he feels has been somewhat helpful. He feels the pain is localized near the medial canthi of his bilateral eyes with some associated swelling, denies cough, congestion, runny nose, or other infectious symptoms. Feels that his eyes are dry. No drainage from his eyes.     Past Medical History  Past Medical History:   Diagnosis Date    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     Past Surgical History:   Procedure Laterality Date    BRAIN TUMOR EXCISION      Age 1 1/2     amLODIPine (NORVASC) 5 MG tablet  Calcium Carb-Cholecalciferol 600-10 MG-MCG CAPS  cyproheptadine (PERIACTIN) 4 MG tablet  fludrocortisone (FLORINEF) 0.1 MG tablet  Fremanezumab-vfrm (AJOVY) 225 MG/1.5ML SOAJ  hydrocortisone (CORTEF) 10 MG tablet  hydrocortisone (CORTEF) 5 MG tablet  magnesium oxide (MAG-OX) 400 MG tablet  melatonin 3 MG tablet  minoxidil (LONITEN) 2.5 MG tablet  ondansetron (ZOFRAN) 4 MG tablet  SOLU-CORTEF 100 MG injection  UBRELVY 100 MG tablet      Allergies   Allergen Reactions    Cherry Other (See Comments)     Other reaction(s): Edema-Airway    Daucus Carota Anaphylaxis     Raw carrots    Other reaction(s): Edema-Airway   Raw carrots    [place] Pulp Other (See Comments), GI Disturbance and Shortness Of Breath     Throat itching, trouble breathing    Other reaction(s): Resp Distress   Throat itching,  trouble breathing   Throat itching, trouble breathing   Throat itching, trouble breathing    [place] Oil Itching    Diethyl Toluamide (Deet) Hives    Nuts Itching    Prunus Persica Itching    Penicillins Dizziness, Hives, Itching and Rash     Other reaction(s): Dizziness     Family History  Family History   Problem Relation Age of Onset    Other - See Comments Mother         ALD carrier    Anxiety Disorder Sister     Mental Illness Paternal Grandmother     Hypertension Paternal Grandfather      Social History   Social History     Tobacco Use    Smoking status: Former     Current packs/day: 0.00     Types: Cigarettes     Quit date: 2022     Years since quitting: 3.2    Smokeless tobacco: Never   Vaping Use    Vaping status: Never Used   Substance Use Topics    Alcohol use: Yes     Comment: Occassionally    Drug use: Yes     Types: Marijuana      A medically appropriate review of systems was performed with pertinent positives and negatives noted in the HPI, and all other systems negative.    Physical Exam   BP: 134/82  Pulse: 61  Temp: 98  F (36.7  C)  Resp: 18  SpO2: 100 %  Physical Exam  Vitals and nursing note reviewed.   Constitutional:       General: He is not in acute distress.     Appearance: Normal appearance. He is not ill-appearing, toxic-appearing or diaphoretic.   HENT:      Head: Normocephalic and atraumatic.      Nose: Nose normal.      Mouth/Throat:      Mouth: Mucous membranes are moist.      Pharynx: Oropharynx is clear.   Eyes:      General: No scleral icterus.        Right eye: No discharge.         Left eye: No discharge.      Extraocular Movements: Extraocular movements intact.      Conjunctiva/sclera: Conjunctivae normal.      Pupils: Pupils are equal, round, and reactive to light.      Comments: No pain with eye movements, no upper or lower eyelid edema bilaterally, no conjunctival injection, no drainage, no foreign body visualized   Cardiovascular:      Rate and Rhythm: Normal rate.   Pulmonary:       Effort: Pulmonary effort is normal. No respiratory distress.   Musculoskeletal:      Cervical back: Normal range of motion and neck supple.   Skin:     General: Skin is warm and dry.      Capillary Refill: Capillary refill takes less than 2 seconds.   Neurological:      General: No focal deficit present.      Mental Status: He is alert and oriented to person, place, and time.         ED Course, Procedures, & Data      Procedures            No results found for any visits on 03/16/25.  Medications - No data to display  Labs Ordered and Resulted from Time of ED Arrival to Time of ED Departure - No data to display  No orders to display          Critical care was not performed.     Medical Decision Making  The patient's presentation was of moderate complexity (an undiagnosed new problem with uncertain prognosis).    The patient's evaluation involved:  review of external note(s) from 3+ sources (clinical notes)    The patient's management necessitated moderate risk (medication management).    Assessment & Plan    Brett Negro is a 24 year old male with a history of chronic migraines who presents to the emergency department with 2 days of eye pain. He reports discomfort and swelling medial to bilateral eyes as well as dry eyes, no vision changes, over the last 2 days after playing outside in the yard with his daughter. He took 10mg of Zyrtec at onset of symptoms and yesterday, used Zaditor eye drops yesterday without improvement although did have some relief with lubricating eye drops. In the ED, blood pressure is elevated to 149/95, otherwise normal vitals, normal HR, normoxic, afebrile. On exam, no edema to upper or lower eyelids bilaterally or around eyes, no erythema, no conjunctival injection, no drainage or crusting, no foreign body visualized, extraocular movements intact and not painful. Do not suspect orbital cellulitis or periorbital cellulitis, do not suspect bacterial or viral conjunctivitis. No  vision changes per patient. Feel that symptoms are most likely related to environmental allergies. Discussed with patient to continue on 10mg of cetirizine daily, can continue with lubricating eye drops as directed. Given phone number for eye clinic and can follow-up tomorrow although discussed reasons to return to the ED sooner including vision changes including loss of or blurry vision. Patient was in understanding and agreement with plan. He remained hemodynamically stable while in the ED. Discharged home in stable condition.     I have reviewed the nursing notes. I have reviewed the findings, diagnosis, plan and need for follow up with the patient.    Discharge Medication List as of 3/16/2025 12:54 PM          Final diagnoses:   Allergic conjunctivitis, bilateral       Odette Stearns PA-C   [facility] EMERGENCY DEPARTMENT  3/16/2025     Odette Stearns PA-C  03/16/25 [id number]

## 2025-03-24 ASSESSMENT — PATIENT HEALTH QUESTIONNAIRE - PHQ9
SUM OF ALL RESPONSES TO PHQ QUESTIONS 1-9: 5
SUM OF ALL RESPONSES TO PHQ QUESTIONS 1-9: 5
10. IF YOU CHECKED OFF ANY PROBLEMS, HOW DIFFICULT HAVE THESE PROBLEMS MADE IT FOR YOU TO DO YOUR WORK, TAKE CARE OF THINGS AT HOME, OR GET ALONG WITH OTHER PEOPLE: SOMEWHAT DIFFICULT

## 2025-03-24 NOTE — PROGRESS NOTES
[facility]    Behavioral Health Clinician Progress Note   Mental Health & Addiction Services      2025    Patient Name: Brett Negro       Service Type:  Individual   Service Location:  [place] / Email (patient reached)   Visit Start Time: 4:27 PM  Visit End Time:  504 pm    Session Length: 16 - 37    Attendees: Patient   Service Modality: Video Visit:      Provider verified identity through the following two step process.  Patient provided:  Patient  and Patient address    Telemedicine Visit: The patient's condition can be safely assessed and treated via synchronous audio and visual telemedicine encounter.      Reason for Telemedicine Visit: Patient convenience (e.g. access to timely appointments / distance to available provider)    Originating Site (Patient Location): Patient's home    Distant Site (Provider Location): Provider Remote Setting- Home Office    Consent:  The patient/guardian has verbally consented to: the potential risks and benefits of telemedicine (video visit) versus in person care; bill my insurance or make self-payment for services provided; and responsibility for payment of non-covered services.     Patient would like the video invitation sent by:  My Chart    Mode of Communication:  Video Conference via [employer]    Distant Location (Provider):  Off-site    As the provider I attest to compliance with applicable laws and regulations related to telemedicine.   Visit number: 1    [facility] Visit Activities (Refresh list every visit): [facility] Only     [place] Diagnostic Assessment: TBD  Treatment Plan Review Date: 2025      DATA:    Extended Session (60+ minutes): No   Interactive Complexity: No   Crisis: No   [facility] Patient: No     Assessments completed:  The following assessments were completed by patient for this visit:  PHQ2:   Phq2 (   1999 [employer],All Rights Reserved. Used With Permission. Developed By Drs.  Adela Pierre,Manny Cheema And Colleagues,With An Educational Estelle From [employer].)    1/15/2025  6:15 PM CST - Filed by Patient   The following questionnaire should only be answered by the patient. Are you the patient? Yes   Over the last 2 weeks, how often have you been bothered by any of the following problems?    Q1: Little interest or pleasure in doing things Not at all   Q2: Feeling down, depressed or hopeless Not at all   PHQ2 (   1999 [employer],ALL RIGHTS RESERVED. USED WITH PERMISSION. DEVELOPED BY AMANDA PARISH,MANNY PEARSON AND COLLEAGUES,WITH AN EDUCATIONAL ESTELLE FROM [employer].)    PHQ-2 Score (range: 0 - 6) 0       PHQ9:       3/10/2025     5:14 PM 3/24/2025     5:32 PM   PHQ-9 SCORE   PHQ-9 Total Score MyChart 4 (Minimal depression) 5 (Mild depression)   PHQ-9 Total Score 4  5        Patient-reported     GAD2:       3/6/2025     9:09 AM 3/24/2025     5:32 PM   ZOHREH-2   Feeling nervous, anxious, or on edge 1 1   Not being able to stop or control worrying 1 1   ZOHREH-2 Total Score 2  2        Patient-reported     GAD7:        No data to display                 Reason for Visit/Presenting Concern:  Depression and Substance Use    Current Stressors / Issues:   MH update: [facility] informed pt that [facility] services are for assessment, short term therapy and referrals as needed. Pt agreed to proceed with appointment. Hx of addiction, has quit cannabis, rarely drinks. He reports that his quitting has greatly enhanced his life. He started using in his early 20's and noticed that it helped his migraines so then he would smoke more to manage his migraines. Eventually, his use started to cause significant problems in his relationships and negatively effected his education. At the end of his jeferson year he went into dual inpt and the outpt treatment. During this time he was dx with bipolar 2 but he isn't sure that is correct.Overall, he found this stay helpful.  However,  "the medication he was prescribed for the bipolar made his sx worse so he went back inpt. He's not currently taking any psychiatric medication and feels stable. He would like to understand the underlying reasons for his use. Pt reports a hx of counseling in the past. Currently things are \"good.\" Chronic migraines.Will be starting graduate school in the Fall.   Stresses:  Appetite: unremarkable  Sleep: unremarkable  Therapy: referral placed  RUBY: hx of cannabis use, no current use    Depression: Lack of interest or pleasure in doing things, Feeling sad, down, or depressed, Feelings of hopelessness, Change in energy level, and Change in sleep  Eli:  No Symptoms  Psychosis: No Symptoms  Anxiety: Excessive worry and Sleep disturbance  Panic:  No symptoms  Post Traumatic Stress Disorder:   TBD    Eating Disorder: No Symptoms  ADD / ADHD:  TBD  Conduct Disorder: No symptoms  Autism Spectrum Disorder: No symptoms  Obsessive Compulsive Disorder: TBD  Personality Disorders:   none    Patient reports the following compulsive behaviors and treatment history: None.      Diagnostic Criteria:   Major Depressive Disorder  A) Recurrent episode(s) - symptoms have been present during the same 2-week period and represent a change from previous functioning 5 or more symptoms (required for diagnosis)   - Depressed mood. Note: In children and adolescents, can be irritable mood.     - Diminished interest or pleasure in all, or almost all, activities.    - Decreased sleep.    - Fatigue or loss of energy.    - Feelings of worthlessness or inappropriate and excessive guilt.    - Diminished ability to think or concentrate, or indecisiveness.   B) The symptoms cause clinically significant distress or impairment in social, occupational, or other important areas of functioning  C) The episode is not attributable to the physiological effects of a substance or to another medical condition  D) The occurence of major depressive episode is not " better explained by other thought / psychotic disorders  E) There has never been a manic episode or hypomanic episode      Therapeutic Interventions:  Cognitive Behavioral Therapy (CBT): Identified and challenged maladaptive patterns of behavior and thinking that interfere with patients life  Psycho-education: Provided psycho-education about patient's behavioral health condition and symptoms.    Response to treatment interventions:   Patient was receptive to interventions utilized.  Patient was engaged in the therapy process.       Progress on Treatment Objective(s) / Homework:   Unable to assess-first appt      Medication Review:   No changes to current psychiatric medication(s)     Medication Compliance:   Yes     Chemical Use Review:  Substance Use: Chemical use reviewed, no active concerns identified      Tobacco Use: No current tobacco use.       Assessment: Current Emotional / Mental Status (status of significant symptoms):    Risk status (Self / Other harm or suicidal ideation)   Patient denies a history of suicidal ideation, suicide attempts, self-injurious behavior, homicidal ideation, homicidal behavior, and and other safety concerns   Patient denies current fears or concerns for personal safety.   Patient denies current or recent suicidal ideation or behaviors.   Patient denies current or recent homicidal ideation or behaviors.   Patient denies current or recent self injurious behavior or ideation.   Patient denies other safety concerns.   Recommended that patient call 911 or go to the local ED should there be a change in any of these risk factors      ASSESSMENT:   Mental Status:     Appearance:   Appropriate    Eye Contact:   Good    Psychomotor Behavior: Normal    Attitude:   Cooperative    Orientation:   All   Speech Rate / Production: Normal    Volume:   Normal    Mood:    Normal   Affect:    Appropriate    Thought Content:  Clear    Thought Form:  Coherent  Logical    Insight:    Good           Diagnoses:   Data Unavailable    Collateral Reports Completed:   Routed note to PCP       Plan: (Homework, other):   Patient was provided No indications of CD issues  Patient was given information about behavioral services and encouraged to schedule a follow up appointment with the clinic [facility] in 1 month.         SKYLER Roman, [facility]     _____________________________________________________________________________________________________________________________________                                              Individual Treatment Plan    Patient's Name: Brett Negro   YOB: 2000  Date of Creation: 03/25/2025  Date Treatment Plan Last Reviewed/Revised: pending    DSM5 Diagnoses: 296.31 (F33.0) Major Depressive Disorder, Recurrent Episode, Mild With anxious distress  Psychosocial / Contextual Factors: Substance Use history/concerns  PROMIS (reviewed every 90 days):   The following assessments were completed by patient for this visit:  TBD      Referral / Collaboration:  The following referral(s) will be initiated: TBD .    Anticipated number of session for this episode of care:  3-6 sessions  Anticipation frequency of session: Biweekly  Anticipated Duration of each session: 16-37 minutes  Treatment plan will be reviewed in 90 days or when goals have been changed.       MeasurableTreatment Goal(s) related to diagnosis / functional impairment(s)  Goal 1: Patient will reduce frequency and intensity of depressive episode(s).  Pt will know they've made progress when they have more energy.    Status: New - Date: 03/25/2025      Intervention(s)  [facility] will Cognitive Behavioral Therapy (CBT): Identify and challenge maladaptive patterns of behavior and thinking that interfere with patient's life  Psycho-education: Provide psycho-education about patient's behavioral health condition and symptoms.       Patient has reviewed and agreed to the above plan.     Written by  SKYLER Roman,  [facility]

## 2025-03-25 ENCOUNTER — VIRTUAL VISIT (OUTPATIENT)
Dept: BEHAVIORAL HEALTH | Facility: CLINIC | Age: 25
End: 2025-03-25
Attending: PHYSICIAN ASSISTANT
Payer: COMMERCIAL

## 2025-03-25 DIAGNOSIS — F33.41 MAJOR DEPRESSIVE DISORDER, RECURRENT EPISODE, IN PARTIAL REMISSION: Primary | ICD-10-CM

## 2025-03-25 PROCEDURE — 90832 PSYTX W PT 30 MINUTES: CPT | Mod: 95 | Performed by: SOCIAL WORKER

## 2025-04-04 ENCOUNTER — MYC MEDICAL ADVICE (OUTPATIENT)
Dept: FAMILY MEDICINE | Facility: CLINIC | Age: 25
End: 2025-04-04
Payer: COMMERCIAL

## 2025-04-04 DIAGNOSIS — E27.40 ADRENAL INSUFFICIENCY: Primary | ICD-10-CM

## 2025-04-07 NOTE — TELEPHONE ENCOUNTER
"Rivera -- unsure how to pend this since it is not in the system. Looks like it is a dual-release hydrocortisone    \"Hope all is well with you. Below is a message from my dad regarding a medication I need related to my adrenal insufficiency that I was hoping you could write a prescription for in the next week or so:      As a reminder and as context for your PCP... for your adrenal insufficiency, several years ago, you switched from Cortef to Plenadren, so you only have to take medication once per day, and the release is smoother during the day vs. spikey with taking Cortef 3x per day.     Since [employer]'s Plenadren is not yet available in the [place], we pick it up once a year when Mom is in the [place] or [place] for work. We have a pharmacy in the [place] that [place] it for [place]. The [place] pharmacy [place] an [place] Plenadren prescription. Also, the pharmacist said that due to inventory constraints, for us to  a year's supply, it's best to get 3 separate Rxs for the Plenadren, and they will fill all of them.     As a reminder you take the 20 mg tabs of Plenadren, and they come 50 to a bottle.     Would it be possible to get this week 3 prescriptions for 100 tabs each that would carry you for the next 9+ months?      Additionally, for clarification, the prescription you already wrote me for Cortef is for emergencies only, whereas the Plenadren is a daily medication. My parents have been helping to manage my adrenal insufficiency since I was a kid, so if you have any questions please feel free to email them. My dad s email is [email or web address] and my mom s is [email or web address].\"    [place]ia You RN  [facility]    "

## 2025-04-09 NOTE — TELEPHONE ENCOUNTER
[employer] message sent to patient.  Natali FRANCOIS BSN, PHN, RN-[facility]  [phone]

## 2025-04-09 NOTE — TELEPHONE ENCOUNTER
This is not a medication that is in our formulary and therefore not something I can write a prescription for at this time.  I apologize.    Rivera Layne PA-C

## 2025-04-09 NOTE — TELEPHONE ENCOUNTER
Call received from patient to check status, states not trying to be a bother, but window prior to parent leaving for  is small and patient is anxious.  Advised high priority message would be sent to clinician for status check.   Patient requests follow up come via [employer]-thanks!

## 2025-04-14 ENCOUNTER — TELEPHONE (OUTPATIENT)
Dept: FAMILY MEDICINE | Facility: CLINIC | Age: 25
End: 2025-04-14
Payer: COMMERCIAL

## 2025-04-14 DIAGNOSIS — E27.40 ADRENAL INSUFFICIENCY: Primary | ICD-10-CM

## 2025-04-14 NOTE — TELEPHONE ENCOUNTER
Medication Question or Refill    Contacts       Contact Date/Time Type Contact Phone/Fax    04/14/2025 05:15 PM CDT Phone (Incoming) Ragini Negroncer Scooter (Self) [phone] (M)            What medication are you calling about (include dose and sig)?: Amlodipine     Preferred Pharmacy:  [facility] - [facility] [address]  [address]  [facility] [address]  Phone: [phone] Fax: [phone]      Controlled Substance Agreement on file:   CSA -- Patient Level:    CSA: None found at the patient level.       Who prescribed the medication?: Previous clinician    Do you need a refill? Yes, received emergency supply from pharm but will be out in three days after that-    When did you use the medication last? Today     Patient offered an appointment? No    Do you have any questions or concerns?  No      Could we send this information to you in Outbox or would you prefer to receive a phone call?:   Patient would like to be contacted via Outbox

## 2025-04-15 RX ORDER — AMLODIPINE BESYLATE 5 MG/1
5 TABLET ORAL
Qty: 90 TABLET | Refills: 3 | Status: SHIPPED | OUTPATIENT
Start: 2025-04-15

## 2025-06-05 DIAGNOSIS — G43.709 CHRONIC MIGRAINE WITHOUT AURA WITHOUT STATUS MIGRAINOSUS, NOT INTRACTABLE: Primary | ICD-10-CM

## 2025-06-25 ASSESSMENT — PATIENT HEALTH QUESTIONNAIRE - PHQ9
SUM OF ALL RESPONSES TO PHQ QUESTIONS 1-9: 7
10. IF YOU CHECKED OFF ANY PROBLEMS, HOW DIFFICULT HAVE THESE PROBLEMS MADE IT FOR YOU TO DO YOUR WORK, TAKE CARE OF THINGS AT HOME, OR GET ALONG WITH OTHER PEOPLE: SOMEWHAT DIFFICULT
SUM OF ALL RESPONSES TO PHQ QUESTIONS 1-9: 7

## 2025-06-25 ASSESSMENT — ANXIETY QUESTIONNAIRES
GAD7 TOTAL SCORE: 9
7. FEELING AFRAID AS IF SOMETHING AWFUL MIGHT HAPPEN: NOT AT ALL
GAD7 TOTAL SCORE: 9
GAD7 TOTAL SCORE: 9
IF YOU CHECKED OFF ANY PROBLEMS ON THIS QUESTIONNAIRE, HOW DIFFICULT HAVE THESE PROBLEMS MADE IT FOR YOU TO DO YOUR WORK, TAKE CARE OF THINGS AT HOME, OR GET ALONG WITH OTHER PEOPLE: SOMEWHAT DIFFICULT
7. FEELING AFRAID AS IF SOMETHING AWFUL MIGHT HAPPEN: NOT AT ALL
2. NOT BEING ABLE TO STOP OR CONTROL WORRYING: MORE THAN HALF THE DAYS
1. FEELING NERVOUS, ANXIOUS, OR ON EDGE: MORE THAN HALF THE DAYS
5. BEING SO RESTLESS THAT IT IS HARD TO SIT STILL: NOT AT ALL
3. WORRYING TOO MUCH ABOUT DIFFERENT THINGS: MORE THAN HALF THE DAYS
8. IF YOU CHECKED OFF ANY PROBLEMS, HOW DIFFICULT HAVE THESE MADE IT FOR YOU TO DO YOUR WORK, TAKE CARE OF THINGS AT HOME, OR GET ALONG WITH OTHER PEOPLE?: SOMEWHAT DIFFICULT
6. BECOMING EASILY ANNOYED OR IRRITABLE: MORE THAN HALF THE DAYS
4. TROUBLE RELAXING: SEVERAL DAYS

## 2025-06-26 ENCOUNTER — OFFICE VISIT (OUTPATIENT)
Dept: FAMILY MEDICINE | Facility: CLINIC | Age: 25
End: 2025-06-26
Payer: COMMERCIAL

## 2025-06-26 VITALS
OXYGEN SATURATION: 98 % | TEMPERATURE: 97.4 F | DIASTOLIC BLOOD PRESSURE: 62 MMHG | SYSTOLIC BLOOD PRESSURE: 104 MMHG | WEIGHT: 175.9 LBS | RESPIRATION RATE: 21 BRPM | BODY MASS INDEX: 25.98 KG/M2 | HEART RATE: 73 BPM

## 2025-06-26 DIAGNOSIS — F41.1 GAD (GENERALIZED ANXIETY DISORDER): Primary | ICD-10-CM

## 2025-06-26 RX ORDER — HYDROXYZINE HYDROCHLORIDE 25 MG/1
25 TABLET, FILM COATED ORAL 3 TIMES DAILY PRN
Qty: 30 TABLET | Refills: 1 | Status: SHIPPED | OUTPATIENT
Start: 2025-06-26

## 2025-06-26 RX ORDER — BUSPIRONE HYDROCHLORIDE 5 MG/1
5 TABLET ORAL 2 TIMES DAILY
Qty: 60 TABLET | Refills: 5 | Status: SHIPPED | OUTPATIENT
Start: 2025-06-26

## 2025-06-26 ASSESSMENT — ENCOUNTER SYMPTOMS: NERVOUS/ANXIOUS: 1

## 2025-06-26 ASSESSMENT — PAIN SCALES - GENERAL: PAINLEVEL_OUTOF10: MILD PAIN (2)

## 2025-06-26 NOTE — PROGRESS NOTES
"  Assessment & Plan     (F41.1) ZOHREH (generalized anxiety disorder)  (primary encounter diagnosis)  Comment:   Plan: busPIRone (BUSPAR) 5 MG tablet, hydrOXYzine HCl        (ATARAX) 25 MG tablet          Treatment options, medication mechanism of action, side effects and expected course of recovery reviewed. If any worsening of symptoms, please contact the clinical team sooner, otherwise follow up in 4 weeks via [facility].      BMI  Estimated body mass index is 25.98 kg/m  as calculated from the following:    Height as of 5/9/25: 1.753 m (5' 9\").    Weight as of this encounter: 79.8 kg (175 lb 14.4 oz).             Subjective   Brett is a 24 year old, presenting for the following health issues:  Anxiety (Wanting to start anxiety med, has never used before. ) and Knee Pain (When it is bad can be 6/10 X last few months, used to be more active. First noticed when using machine to work out that made legs go into a squatting or crunching position )      6/26/2025     7:06 AM   Additional Questions   Roomed by Ramila GUPTA   Accompanied by self         6/26/2025     7:06 AM   Patient Reported Additional Medications   Patient reports taking the following new medications no     Anxiety    Knee Pain    History of Present Illness       Reason for visit:  Starting (non-addictive) anxiety medication as per therapist request   Brett is taking medications regularly.          Anxiety   How are you doing with your anxiety since your last visit? Worsened with recent work stressors, evaluations  Are you having other symptoms that might be associated with anxiety? Yes:  excessive worry  Have you had a significant life event? No   Are you feeling depressed? No  Do you have any concerns with your use of alcohol or other drugs? No      Previous mental health medications trialed:    Effexor  Seroquel  Latuda  Depakote    Social History     Tobacco Use    Smoking status: Former     Current packs/day: 0.00     Types: Cigarettes     Quit date: " 2022     Years since quitting: 3.4    Smokeless tobacco: Never   Vaping Use    Vaping status: Never Used   Substance Use Topics    Alcohol use: Yes     Comment: Occassionally    Drug use: Yes     Types: Marijuana         6/25/2025    12:33 PM   ZOHREH-7 SCORE   Total Score 9 (mild anxiety)   Total Score 9        Patient-reported         3/10/2025     5:14 PM 3/24/2025     5:32 PM 6/25/2025    12:32 PM   PHQ   PHQ-9 Total Score 4  5  7    Q9: Thoughts of better off dead/self-harm past 2 weeks Not at all Not at all Not at all       Patient-reported             Objective    /62 (BP Location: Right arm, Patient Position: Sitting, Cuff Size: Adult Regular)   Pulse 73   Temp 97.4  F (36.3  C) (Temporal)   Resp 21   Wt 79.8 kg (175 lb 14.4 oz)   SpO2 98%   BMI 25.98 kg/m    Body mass index is 25.98 kg/m .  Physical Exam   GENERAL: healthy, alert and no distress  EYES: Eyes grossly normal to inspection, EOM intact and conjunctivae normal  RESP: breathing comfortably on room air  PSYCH: mentation appears normal, affect normal/bright              Signed Electronically by: Rivera Layne PA-C

## 2025-07-08 ENCOUNTER — MYC REFILL (OUTPATIENT)
Dept: TRANSPLANT | Facility: CLINIC | Age: 25
End: 2025-07-08
Payer: COMMERCIAL

## 2025-07-08 DIAGNOSIS — G43.709 CHRONIC MIGRAINE WITHOUT AURA WITHOUT STATUS MIGRAINOSUS, NOT INTRACTABLE: ICD-10-CM

## 2025-07-09 RX ORDER — UBROGEPANT 100 MG/1
100 TABLET ORAL
Qty: 16 TABLET | Refills: 11 | Status: SHIPPED | OUTPATIENT
Start: 2025-07-09

## 2025-07-15 DIAGNOSIS — G43.709 CHRONIC MIGRAINE WITHOUT AURA WITHOUT STATUS MIGRAINOSUS, NOT INTRACTABLE: ICD-10-CM

## 2025-07-15 RX ORDER — FREMANEZUMAB-VFRM 225 MG/1.5ML
INJECTION SUBCUTANEOUS
Qty: 1.5 ML | Refills: 11 | Status: SHIPPED | OUTPATIENT
Start: 2025-07-15

## 2025-07-15 NOTE — TELEPHONE ENCOUNTER
Last Written Prescription Date:  8/28/2024  Last Fill Quantity: 1.5 mL,  # refills: 11   Last office visit: Last medication follow up 9/16/24  Future Office Visit: Next medication follow up 9/15/25    Routing refill request to provider for review/approval because:  Drug not on the [facility] refill protocol     Kaylynn CINTRON RN, BSN  [facility]

## 2025-08-20 DIAGNOSIS — G43.709 CHRONIC MIGRAINE WITHOUT AURA WITHOUT STATUS MIGRAINOSUS, NOT INTRACTABLE: Primary | ICD-10-CM

## 2025-09-04 ENCOUNTER — OFFICE VISIT (OUTPATIENT)
Dept: NEUROLOGY | Facility: CLINIC | Age: 25
End: 2025-09-04
Payer: COMMERCIAL

## 2025-09-04 DIAGNOSIS — G43.709 CHRONIC MIGRAINE WITHOUT AURA WITHOUT STATUS MIGRAINOSUS, NOT INTRACTABLE: Primary | ICD-10-CM
